# Patient Record
Sex: FEMALE | Race: WHITE | NOT HISPANIC OR LATINO | Employment: FULL TIME | URBAN - METROPOLITAN AREA
[De-identification: names, ages, dates, MRNs, and addresses within clinical notes are randomized per-mention and may not be internally consistent; named-entity substitution may affect disease eponyms.]

---

## 2017-06-27 ENCOUNTER — ALLSCRIPTS OFFICE VISIT (OUTPATIENT)
Dept: OTHER | Facility: OTHER | Age: 32
End: 2017-06-27

## 2017-06-27 DIAGNOSIS — E03.9 HYPOTHYROIDISM: ICD-10-CM

## 2017-06-27 DIAGNOSIS — G43.909 MIGRAINE WITHOUT STATUS MIGRAINOSUS, NOT INTRACTABLE: ICD-10-CM

## 2017-06-27 DIAGNOSIS — E78.5 HYPERLIPIDEMIA: ICD-10-CM

## 2017-06-27 DIAGNOSIS — R10.9 ABDOMINAL PAIN: ICD-10-CM

## 2017-06-30 ENCOUNTER — TRANSCRIBE ORDERS (OUTPATIENT)
Dept: ADMINISTRATIVE | Age: 32
End: 2017-06-30

## 2017-06-30 ENCOUNTER — APPOINTMENT (OUTPATIENT)
Dept: LAB | Age: 32
End: 2017-06-30
Payer: COMMERCIAL

## 2017-06-30 ENCOUNTER — HOSPITAL ENCOUNTER (OUTPATIENT)
Dept: RADIOLOGY | Age: 32
Discharge: HOME | End: 2017-06-30
Payer: COMMERCIAL

## 2017-06-30 DIAGNOSIS — R10.9 ABDOMINAL PAIN: ICD-10-CM

## 2017-06-30 DIAGNOSIS — E78.5 HYPERLIPIDEMIA: ICD-10-CM

## 2017-06-30 DIAGNOSIS — G43.909 MIGRAINE WITHOUT STATUS MIGRAINOSUS, NOT INTRACTABLE: ICD-10-CM

## 2017-06-30 DIAGNOSIS — E03.9 HYPOTHYROIDISM: ICD-10-CM

## 2017-06-30 LAB
ALBUMIN SERPL BCP-MCNC: 3.5 G/DL (ref 3.5–5)
ALP SERPL-CCNC: 47 U/L (ref 46–116)
ALT SERPL W P-5'-P-CCNC: 16 U/L (ref 12–78)
ANION GAP SERPL CALCULATED.3IONS-SCNC: 4 MMOL/L (ref 4–13)
AST SERPL W P-5'-P-CCNC: 14 U/L (ref 5–45)
BILIRUB SERPL-MCNC: 0.28 MG/DL (ref 0.2–1)
BUN SERPL-MCNC: 12 MG/DL (ref 5–25)
CALCIUM SERPL-MCNC: 8.7 MG/DL (ref 8.3–10.1)
CHLORIDE SERPL-SCNC: 105 MMOL/L (ref 100–108)
CHOLEST SERPL-MCNC: 236 MG/DL (ref 50–200)
CO2 SERPL-SCNC: 27 MMOL/L (ref 21–32)
CREAT SERPL-MCNC: 0.78 MG/DL (ref 0.6–1.3)
ERYTHROCYTE [DISTWIDTH] IN BLOOD BY AUTOMATED COUNT: 12.5 % (ref 11.6–15.1)
GFR SERPL CREATININE-BSD FRML MDRD: >60 ML/MIN/1.73SQ M
GLUCOSE P FAST SERPL-MCNC: 82 MG/DL (ref 65–99)
HCT VFR BLD AUTO: 39.5 % (ref 34.8–46.1)
HDLC SERPL-MCNC: 71 MG/DL (ref 40–60)
HGB BLD-MCNC: 12.8 G/DL (ref 11.5–15.4)
LDLC SERPL CALC-MCNC: 131 MG/DL (ref 0–100)
MCH RBC QN AUTO: 28.8 PG (ref 26.8–34.3)
MCHC RBC AUTO-ENTMCNC: 32.4 G/DL (ref 31.4–37.4)
MCV RBC AUTO: 89 FL (ref 82–98)
PLATELET # BLD AUTO: 236 THOUSANDS/UL (ref 149–390)
PMV BLD AUTO: 10.9 FL (ref 8.9–12.7)
POTASSIUM SERPL-SCNC: 4.1 MMOL/L (ref 3.5–5.3)
PROT SERPL-MCNC: 7.2 G/DL (ref 6.4–8.2)
RBC # BLD AUTO: 4.44 MILLION/UL (ref 3.81–5.12)
SODIUM SERPL-SCNC: 136 MMOL/L (ref 136–145)
TRIGL SERPL-MCNC: 168 MG/DL
TSH SERPL DL<=0.05 MIU/L-ACNC: 3.78 UIU/ML (ref 0.36–3.74)
WBC # BLD AUTO: 5.7 THOUSAND/UL (ref 4.31–10.16)

## 2017-06-30 PROCEDURE — 80053 COMPREHEN METABOLIC PANEL: CPT

## 2017-06-30 PROCEDURE — 85027 COMPLETE CBC AUTOMATED: CPT

## 2017-06-30 PROCEDURE — 84443 ASSAY THYROID STIM HORMONE: CPT

## 2017-06-30 PROCEDURE — 80061 LIPID PANEL: CPT

## 2017-06-30 PROCEDURE — 76705 ECHO EXAM OF ABDOMEN: CPT

## 2017-06-30 PROCEDURE — 36415 COLL VENOUS BLD VENIPUNCTURE: CPT

## 2017-07-06 ENCOUNTER — ALLSCRIPTS OFFICE VISIT (OUTPATIENT)
Dept: OTHER | Facility: OTHER | Age: 32
End: 2017-07-06

## 2017-10-03 ENCOUNTER — GENERIC CONVERSION - ENCOUNTER (OUTPATIENT)
Dept: OTHER | Facility: OTHER | Age: 32
End: 2017-10-03

## 2017-11-27 ENCOUNTER — ALLSCRIPTS OFFICE VISIT (OUTPATIENT)
Dept: OTHER | Facility: OTHER | Age: 32
End: 2017-11-27

## 2017-11-27 LAB
FLUAV AG SPEC QL IA: POSITIVE
INFLUENZA B AG (HISTORICAL): NEGATIVE

## 2017-11-29 ENCOUNTER — GENERIC CONVERSION - ENCOUNTER (OUTPATIENT)
Dept: OTHER | Facility: OTHER | Age: 32
End: 2017-11-29

## 2018-01-12 VITALS
WEIGHT: 157 LBS | HEIGHT: 66 IN | SYSTOLIC BLOOD PRESSURE: 110 MMHG | TEMPERATURE: 98.8 F | DIASTOLIC BLOOD PRESSURE: 72 MMHG | HEART RATE: 75 BPM | BODY MASS INDEX: 25.23 KG/M2 | OXYGEN SATURATION: 98 %

## 2018-01-13 VITALS
TEMPERATURE: 98.2 F | OXYGEN SATURATION: 96 % | HEIGHT: 66 IN | HEART RATE: 98 BPM | DIASTOLIC BLOOD PRESSURE: 72 MMHG | SYSTOLIC BLOOD PRESSURE: 116 MMHG

## 2018-01-13 VITALS
HEIGHT: 66 IN | OXYGEN SATURATION: 99 % | WEIGHT: 156.13 LBS | BODY MASS INDEX: 25.09 KG/M2 | DIASTOLIC BLOOD PRESSURE: 72 MMHG | SYSTOLIC BLOOD PRESSURE: 104 MMHG | HEART RATE: 76 BPM | TEMPERATURE: 98.4 F

## 2018-01-22 VITALS
HEIGHT: 66 IN | BODY MASS INDEX: 25.88 KG/M2 | SYSTOLIC BLOOD PRESSURE: 124 MMHG | DIASTOLIC BLOOD PRESSURE: 80 MMHG | WEIGHT: 161 LBS

## 2018-01-22 VITALS
HEART RATE: 65 BPM | HEIGHT: 66 IN | OXYGEN SATURATION: 97 % | DIASTOLIC BLOOD PRESSURE: 74 MMHG | WEIGHT: 158.5 LBS | SYSTOLIC BLOOD PRESSURE: 110 MMHG | BODY MASS INDEX: 25.47 KG/M2 | TEMPERATURE: 97.8 F

## 2018-10-04 ENCOUNTER — OFFICE VISIT (OUTPATIENT)
Dept: INTERNAL MEDICINE CLINIC | Facility: CLINIC | Age: 33
End: 2018-10-04
Payer: COMMERCIAL

## 2018-10-04 VITALS
BODY MASS INDEX: 26.84 KG/M2 | TEMPERATURE: 98.6 F | HEIGHT: 66 IN | HEART RATE: 69 BPM | DIASTOLIC BLOOD PRESSURE: 82 MMHG | OXYGEN SATURATION: 96 % | WEIGHT: 167 LBS | SYSTOLIC BLOOD PRESSURE: 122 MMHG

## 2018-10-04 DIAGNOSIS — E78.00 HYPERCHOLESTEREMIA: ICD-10-CM

## 2018-10-04 DIAGNOSIS — Z00.00 HEALTH CARE MAINTENANCE: Primary | ICD-10-CM

## 2018-10-04 DIAGNOSIS — Z23 NEEDS FLU SHOT: ICD-10-CM

## 2018-10-04 PROCEDURE — 90686 IIV4 VACC NO PRSV 0.5 ML IM: CPT | Performed by: NURSE PRACTITIONER

## 2018-10-04 PROCEDURE — 90471 IMMUNIZATION ADMIN: CPT

## 2018-10-04 PROCEDURE — 99395 PREV VISIT EST AGE 18-39: CPT | Performed by: NURSE PRACTITIONER

## 2018-10-04 RX ORDER — LORATADINE 10 MG/1
1 TABLET ORAL AS NEEDED
COMMUNITY
Start: 2017-11-27 | End: 2019-11-01 | Stop reason: ALTCHOICE

## 2018-10-04 RX ORDER — DROSPIRENONE AND ETHINYL ESTRADIOL 0.03MG-3MG
1 KIT ORAL DAILY
COMMUNITY
Start: 2017-07-27 | End: 2018-10-09 | Stop reason: SDUPTHER

## 2018-10-04 NOTE — PATIENT INSTRUCTIONS
PAP:  UTD - followed by GYN    Continue with healthy lifestyle  Recommend exercise on a routine basis  Discussed with patient some dietary modifications for her cholesterol  Eat more chicken/ fish/turkey, lean protein  Limit red meat  Yoga would be good for her back pain  Work on be stressing techniques  Will update blood work  I will call you with the result  Return yearly for physical or sooner if needed

## 2018-10-04 NOTE — PROGRESS NOTES
Assessment/Plan:    PAP:  UTD - followed by GYN  Influenza given today    Continue with healthy lifestyle  Recommend exercise on a routine basis  Discussed with patient some dietary modifications for her cholesterol  Eat more chicken/ fish/turkey, lean protein  Limit red meat  Yoga would be good for her back pain  Work on be stressing techniques  Will update blood work  I will call you with the result  Return yearly for physical or sooner if needed  Diagnoses and all orders for this visit:    Health care maintenance    Hypercholesteremia  -     CBC and differential; Future  -     Comprehensive metabolic panel; Future  -     Lipid panel; Future  -     TSH, 3rd generation with Free T4 reflex; Future    Needs flu shot  -     SYRINGE/SINGLE-DOSE VIAL: influenza vaccine, [id number], quadrivalent, 0 5 mL, preservative-free, for patients 3+ yr (FLUZONE)    Other orders  -     drospirenone-ethinyl estradiol (DAVID) 3-0 03 MG per tablet; Take 1 tablet by mouth daily  -     loratadine (CLARITIN) 10 mg tablet; Take 1 tablet by mouth as needed        Subjective:      Patient ID: Andrea Ham is a 28 y o  female and presents today for Health Maintenance Physical     Last Physical: 1 year ago  Last GYN Exam:  Last year - followed by GYN    Pt reports overall health:  yes    Healthy Diet:  Overall healthy  Adequate fruits and vegetables  Primarily chicken    Primarily water and ice tea  Routine Exercise:  no  Weight Concerns:  no    Problems with vision:  no  Last Eye Exam:  approx 3 year    Problems with Hearing:  no    Routine Dental Exams:  yes    Smoking History:  no  ETOH Use:  Few times a week a glass of wine  Illegal Drug Use:  no  Caffeine Use:  approx 30 oz coffee daily    Last PAP:  approx 3 years ago - followed by PAP  History of abnormal PAP:  no    Last Mammo: never  Family History of Breast CA:  no  Family History of Colon CA:  no    The following portions of the patient's history were reviewed and updated as appropriate: allergies, current medications, past family history, past medical history, past social history, past surgical history and problem list     Review of Systems   Constitutional: Negative for chills, fatigue and fever  HENT: Positive for sore throat ("tickle" last nights, slight, fells like allergies)  Negative for congestion, ear pain, postnasal drip, sinus pain and sinus pressure  Eyes: Negative for visual disturbance  Respiratory: Negative for cough, shortness of breath and wheezing  Cardiovascular: Negative for chest pain and palpitations  Gastrointestinal: Negative for abdominal pain, constipation, diarrhea, nausea and vomiting  Genitourinary: Negative for difficulty urinating, dysuria, hematuria, vaginal bleeding and vaginal discharge  Musculoskeletal: Positive for back pain  Negative for arthralgias and myalgias  Skin: Negative for rash  Neurological: Negative for dizziness, light-headedness, numbness and headaches  Psychiatric/Behavioral: Negative for dysphoric mood and sleep disturbance  The patient is not nervous/anxious  Patient does report she does has episodes of "short fuse" and irritability  This is primarily relating to her home life  Patient reports she does not experience this at work  Patient denies problems with in the home life  Just her children can be demanding at times         History reviewed  No pertinent past medical history        Current Outpatient Prescriptions:     drospirenone-ethinyl estradiol (DAVID) 3-0 03 MG per tablet, Take 1 tablet by mouth daily, Disp: , Rfl:     loratadine (CLARITIN) 10 mg tablet, Take 1 tablet by mouth as needed, Disp: , Rfl:     Allergies   Allergen Reactions    Pollen Extract Allergic Rhinitis       Social History   Past Surgical History:   Procedure Laterality Date    TOOTH EXTRACTION       Family History   Problem Relation Age of Onset    Hypothyroidism Mother     Coronary artery disease Father     Heart attack Father     Hypertension Father     Lung cancer Paternal Grandmother     Diabetes Paternal Grandfather     Hearing loss Daughter         deaf in right ear, microtia of right ear    Depression Family     Post-traumatic stress disorder Family     Anxiety disorder Family         symptom    Migraines Family     Thyroid disease Family         thyroid disorder        Objective:  /82 (BP Location: Left arm, Patient Position: Sitting, Cuff Size: Large)   Pulse 69   Temp 98 6 °F (37 °C) (Oral)   Ht 5' 6" (1 676 m)   Wt 75 8 kg (167 lb)   SpO2 96% Comment: room air  BMI 26 95 kg/m²      Physical Exam   Constitutional: She is oriented to person, place, and time  She appears well-developed and well-nourished  No distress  HENT:   Head: Normocephalic and atraumatic  Right Ear: Hearing, tympanic membrane, external ear and ear canal normal    Left Ear: Hearing, tympanic membrane, external ear and ear canal normal    Nose: No mucosal edema or rhinorrhea  Right sinus exhibits no maxillary sinus tenderness and no frontal sinus tenderness  Left sinus exhibits no maxillary sinus tenderness and no frontal sinus tenderness  Mouth/Throat: Uvula is midline, oropharynx is clear and moist and mucous membranes are normal    No post nasal gtt   Neck: Neck supple  No thyromegaly present  Cardiovascular: Normal rate, regular rhythm and normal heart sounds  No murmur heard  No pedal edema   Pulmonary/Chest: Effort normal and breath sounds normal  No respiratory distress  She has no wheezes  Abdominal: Soft  Bowel sounds are normal  She exhibits no distension  There is no tenderness  Musculoskeletal: Normal range of motion  Lymphadenopathy:     She has no cervical adenopathy  Neurological: She is alert and oriented to person, place, and time  No cranial nerve deficit  Skin: Skin is warm and dry  No rash noted  Psychiatric: She has a normal mood and affect   Her behavior is normal  Judgment and thought content normal    Nursing note and vitals reviewed

## 2018-10-09 ENCOUNTER — ANNUAL EXAM (OUTPATIENT)
Dept: OBGYN CLINIC | Facility: CLINIC | Age: 33
End: 2018-10-09
Payer: COMMERCIAL

## 2018-10-09 VITALS
BODY MASS INDEX: 27 KG/M2 | SYSTOLIC BLOOD PRESSURE: 108 MMHG | WEIGHT: 168 LBS | HEIGHT: 66 IN | DIASTOLIC BLOOD PRESSURE: 78 MMHG

## 2018-10-09 DIAGNOSIS — Z30.011 ORAL CONTRACEPTIVE PRESCRIBED: ICD-10-CM

## 2018-10-09 DIAGNOSIS — Z01.419 ENCOUNTER FOR GYNECOLOGICAL EXAMINATION WITHOUT ABNORMAL FINDING: Primary | ICD-10-CM

## 2018-10-09 PROBLEM — Z00.00 HEALTH CARE MAINTENANCE: Status: RESOLVED | Noted: 2018-10-04 | Resolved: 2018-10-09

## 2018-10-09 PROCEDURE — S0612 ANNUAL GYNECOLOGICAL EXAMINA: HCPCS | Performed by: OBSTETRICS & GYNECOLOGY

## 2018-10-09 RX ORDER — DROSPIRENONE AND ETHINYL ESTRADIOL 0.03MG-3MG
1 KIT ORAL DAILY
Qty: 90 TABLET | Refills: 3 | Status: SHIPPED | OUTPATIENT
Start: 2018-10-09 | End: 2020-10-14 | Stop reason: ALTCHOICE

## 2018-10-09 NOTE — PROGRESS NOTES
Karoline Penn  1985      CC:  Yearly exam    S:  28 y o  female here for yearly exam  Her cycles are regular, not heavy or crampy  She is sexually active  She uses her 's vasectomy and BCP (as her  has not yet had his confirmatory SA) for contraception  She will stop the BCP when he has his semen analysis  Last Pap 9/1/2016 normal/negative HPV      Current Outpatient Prescriptions:     drospirenone-ethinyl estradiol (DAVID) 3-0 03 MG per tablet, Take 1 tablet by mouth daily, Disp: , Rfl:     loratadine (CLARITIN) 10 mg tablet, Take 1 tablet by mouth as needed, Disp: , Rfl:   Social History     Social History    Marital status: /Civil Union     Spouse name: N/A    Number of children: N/A    Years of education: N/A     Occupational History    Not on file  Social History Main Topics    Smoking status: Never Smoker    Smokeless tobacco: Never Used    Alcohol use 4 2 oz/week     7 Glasses of wine per week    Drug use: No    Sexual activity: Yes     Birth control/ protection: Pill     Other Topics Concern    Not on file     Social History Narrative    Always uses seat belt     Daily coffee consumption (2cups/day)    Lack of exercise      Family History   Problem Relation Age of Onset    Hypothyroidism Mother     Coronary artery disease Father     Heart attack Father     Hypertension Father     Lung cancer Paternal Grandmother     Diabetes Paternal Grandfather     Hearing loss Daughter         deaf in right ear, microtia of right ear    Depression Family     Post-traumatic stress disorder Family     Anxiety disorder Family         symptom    Migraines Family     Thyroid disease Family         thyroid disorder     No Known Problems Sister     No Known Problems Sister       History reviewed  No pertinent past medical history  O:  Blood pressure 108/78, height 5' 5 5" (1 664 m), weight 76 2 kg (168 lb), last menstrual period 09/18/2018      Patient appears well and is not in distress  Neck is supple without masses  Breasts are symmetrical without mass, tenderness, nipple discharge, skin changes or adenopathy  Abdomen is soft and nontender without masses  External genitals are normal without lesions or rashes  Vagina is normal without discharge or bleeding  Cervix is normal without discharge or lesion  Uterus is normal, mobile, nontender without palpable mass  Adnexa are normal, nontender, without palpable mass  A:  Yearly exam      P:   Pap due 2021   Rx sent for BCP   RTO one year for yearly exam or sooner as needed

## 2018-12-21 ENCOUNTER — CLINICAL SUPPORT (OUTPATIENT)
Dept: INTERNAL MEDICINE CLINIC | Facility: CLINIC | Age: 33
End: 2018-12-21
Payer: COMMERCIAL

## 2018-12-21 DIAGNOSIS — E78.00 HYPERCHOLESTEREMIA: Primary | ICD-10-CM

## 2018-12-21 LAB
ALBUMIN SERPL BCP-MCNC: 3.8 G/DL (ref 3.5–5)
ALP SERPL-CCNC: 56 U/L (ref 46–116)
ALT SERPL W P-5'-P-CCNC: 19 U/L (ref 12–78)
ANION GAP SERPL CALCULATED.3IONS-SCNC: 8 MMOL/L (ref 4–13)
AST SERPL W P-5'-P-CCNC: 13 U/L (ref 5–45)
BASOPHILS # BLD AUTO: 0.03 THOUSANDS/ΜL (ref 0–0.1)
BASOPHILS NFR BLD AUTO: 0 % (ref 0–1)
BILIRUB SERPL-MCNC: 0.37 MG/DL (ref 0.2–1)
BUN SERPL-MCNC: 15 MG/DL (ref 5–25)
CALCIUM SERPL-MCNC: 9.1 MG/DL (ref 8.3–10.1)
CHLORIDE SERPL-SCNC: 104 MMOL/L (ref 100–108)
CHOLEST SERPL-MCNC: 267 MG/DL (ref 50–200)
CO2 SERPL-SCNC: 25 MMOL/L (ref 21–32)
CREAT SERPL-MCNC: 0.86 MG/DL (ref 0.6–1.3)
EOSINOPHIL # BLD AUTO: 0.11 THOUSAND/ΜL (ref 0–0.61)
EOSINOPHIL NFR BLD AUTO: 2 % (ref 0–6)
ERYTHROCYTE [DISTWIDTH] IN BLOOD BY AUTOMATED COUNT: 12.5 % (ref 11.6–15.1)
GFR SERPL CREATININE-BSD FRML MDRD: 88 ML/MIN/1.73SQ M
GLUCOSE P FAST SERPL-MCNC: 87 MG/DL (ref 65–99)
HCT VFR BLD AUTO: 42.2 % (ref 34.8–46.1)
HDLC SERPL-MCNC: 72 MG/DL (ref 40–60)
HGB BLD-MCNC: 13.2 G/DL (ref 11.5–15.4)
IMM GRANULOCYTES # BLD AUTO: 0.02 THOUSAND/UL (ref 0–0.2)
IMM GRANULOCYTES NFR BLD AUTO: 0 % (ref 0–2)
LDLC SERPL CALC-MCNC: 151 MG/DL (ref 0–100)
LYMPHOCYTES # BLD AUTO: 2.5 THOUSANDS/ΜL (ref 0.6–4.47)
LYMPHOCYTES NFR BLD AUTO: 37 % (ref 14–44)
MCH RBC QN AUTO: 29.1 PG (ref 26.8–34.3)
MCHC RBC AUTO-ENTMCNC: 31.3 G/DL (ref 31.4–37.4)
MCV RBC AUTO: 93 FL (ref 82–98)
MONOCYTES # BLD AUTO: 0.56 THOUSAND/ΜL (ref 0.17–1.22)
MONOCYTES NFR BLD AUTO: 8 % (ref 4–12)
NEUTROPHILS # BLD AUTO: 3.61 THOUSANDS/ΜL (ref 1.85–7.62)
NEUTS SEG NFR BLD AUTO: 53 % (ref 43–75)
NONHDLC SERPL-MCNC: 195 MG/DL
NRBC BLD AUTO-RTO: 0 /100 WBCS
PLATELET # BLD AUTO: 251 THOUSANDS/UL (ref 149–390)
PMV BLD AUTO: 10.8 FL (ref 8.9–12.7)
POTASSIUM SERPL-SCNC: 4.4 MMOL/L (ref 3.5–5.3)
PROT SERPL-MCNC: 7.5 G/DL (ref 6.4–8.2)
RBC # BLD AUTO: 4.53 MILLION/UL (ref 3.81–5.12)
SODIUM SERPL-SCNC: 137 MMOL/L (ref 136–145)
T4 FREE SERPL-MCNC: 0.79 NG/DL (ref 0.76–1.46)
TRIGL SERPL-MCNC: 222 MG/DL
TSH SERPL DL<=0.05 MIU/L-ACNC: 4.17 UIU/ML (ref 0.36–3.74)
WBC # BLD AUTO: 6.83 THOUSAND/UL (ref 4.31–10.16)

## 2018-12-21 PROCEDURE — 80061 LIPID PANEL: CPT | Performed by: NURSE PRACTITIONER

## 2018-12-21 PROCEDURE — 36415 COLL VENOUS BLD VENIPUNCTURE: CPT

## 2018-12-21 PROCEDURE — 85025 COMPLETE CBC W/AUTO DIFF WBC: CPT | Performed by: NURSE PRACTITIONER

## 2018-12-21 PROCEDURE — 84439 ASSAY OF FREE THYROXINE: CPT

## 2018-12-21 PROCEDURE — 84443 ASSAY THYROID STIM HORMONE: CPT | Performed by: NURSE PRACTITIONER

## 2018-12-21 PROCEDURE — 80053 COMPREHEN METABOLIC PANEL: CPT | Performed by: NURSE PRACTITIONER

## 2018-12-26 ENCOUNTER — TELEPHONE (OUTPATIENT)
Dept: INTERNAL MEDICINE CLINIC | Age: 33
End: 2018-12-26

## 2018-12-26 NOTE — TELEPHONE ENCOUNTER
Please have pt schedule follow-up to review labs  Non-urgent  But few things to discuss further - has only been seen for physical recently    THANKS!!

## 2018-12-28 ENCOUNTER — OFFICE VISIT (OUTPATIENT)
Dept: INTERNAL MEDICINE CLINIC | Age: 33
End: 2018-12-28
Payer: COMMERCIAL

## 2018-12-28 VITALS
BODY MASS INDEX: 27.53 KG/M2 | HEART RATE: 75 BPM | WEIGHT: 175.4 LBS | HEIGHT: 67 IN | DIASTOLIC BLOOD PRESSURE: 72 MMHG | OXYGEN SATURATION: 98 % | SYSTOLIC BLOOD PRESSURE: 110 MMHG | TEMPERATURE: 97.9 F

## 2018-12-28 DIAGNOSIS — E78.00 HYPERCHOLESTEREMIA: Primary | ICD-10-CM

## 2018-12-28 DIAGNOSIS — E03.8 SUBCLINICAL HYPOTHYROIDISM: ICD-10-CM

## 2018-12-28 PROCEDURE — 99213 OFFICE O/P EST LOW 20 MIN: CPT | Performed by: NURSE PRACTITIONER

## 2018-12-28 RX ORDER — CHLORAL HYDRATE 500 MG
1000 CAPSULE ORAL DAILY
Refills: 0
Start: 2018-12-28 | End: 2020-11-13

## 2018-12-28 NOTE — PROGRESS NOTES
Assessment/Plan:    HLD  No other cardiac risk factors  Start fish oil 1000mg daily if tolerates increase to BID  Pt is going to work on diet and exercise with the new year  Repeat lipids in 1 year    Subclinical Hypothyroid  Asymptomatic  Monitor  Repeat TSH 1 year    Pt to follow-up in 1 year, sooner if needed  Labs given for next year     Diagnoses and all orders for this visit:    Hypercholesteremia  -     Lipid panel; Future  -     CBC and differential; Future  -     Comprehensive metabolic panel; Future  -     TSH, 3rd generation with Free T4 reflex; Future  -     Omega-3 Fatty Acids (FISH OIL) 1,000 mg; Take 1 capsule (1,000 mg total) by mouth daily    Subclinical hypothyroidism          Subjective:      Patient ID: Krishna Zuñiga is a 28 y o  female  Pt presents for follow-up to review labs  Pt is doing well with no concerns  Hyperlipidemia:  Pt is here for follow-up hyperlipidemia  Pt is doing fair with no concerns  Pt is currently taking no medications  Previously was on fish oil approx 10 years ago  She does have a family history of HLD - her father  Pt diet consists of room for improvement - snacks and sweets  50/50 red meat and chicken/seafood  The pt does exercise on a routine basis  Last lipid panel was 12/2018    Subclinical Hypothyroid  Thyroid Problem   Presents for follow-up visit  Symptoms include anxiety and weight gain (gradual - however diet isn't as good as previously and has not been exercising)  Patient reports no cold intolerance, constipation, depressed mood, diarrhea, dry skin, fatigue, hair loss, heat intolerance, menstrual problem or palpitations     Pt not currently on medications    The following portions of the patient's history were reviewed and updated as appropriate: allergies, current medications, past family history, past medical history, past social history, past surgical history and problem list     Review of Systems   Constitutional: Positive for weight gain (gradual - however diet isn't as good as previously and has not been exercising)  Negative for chills, fatigue and fever  Eyes: Negative for visual disturbance  Respiratory: Negative for cough, shortness of breath and wheezing  Cardiovascular: Negative for chest pain and palpitations  Gastrointestinal: Negative for abdominal pain, constipation, diarrhea, nausea and vomiting  Endocrine: Negative for cold intolerance and heat intolerance  Genitourinary: Negative for menstrual problem and vaginal discharge  Skin: Negative for rash  Neurological: Negative for dizziness, syncope, light-headedness, numbness and headaches  Psychiatric/Behavioral: Negative for dysphoric mood and sleep disturbance  The patient is nervous/anxious  History reviewed  No pertinent past medical history        Current Outpatient Prescriptions:     drospirenone-ethinyl estradiol (DAVID) 3-0 03 MG per tablet, Take 1 tablet by mouth daily, Disp: 90 tablet, Rfl: 3    loratadine (CLARITIN) 10 mg tablet, Take 1 tablet by mouth as needed, Disp: , Rfl:     Omega-3 Fatty Acids (FISH OIL) 1,000 mg, Take 1 capsule (1,000 mg total) by mouth daily, Disp: , Rfl: 0    Allergies   Allergen Reactions    Pollen Extract Allergic Rhinitis       Social History   Past Surgical History:   Procedure Laterality Date    TOOTH EXTRACTION       Family History   Problem Relation Age of Onset    Hypothyroidism Mother     Coronary artery disease Father     Heart attack Father     Hypertension Father     Lung cancer Paternal Grandmother     Diabetes Paternal Grandfather     Hearing loss Daughter         deaf in right ear, microtia of right ear    Depression Family     Post-traumatic stress disorder Family     Anxiety disorder Family         symptom    Migraines Family     Thyroid disease Family         thyroid disorder     No Known Problems Sister     No Known Problems Sister        Objective:  /72 (BP Location: Left arm, Patient Position: Sitting, Cuff Size: Adult)   Pulse 75   Temp 97 9 °F (36 6 °C) (Tympanic)   Ht 5' 6 73" (1 695 m)   Wt 79 6 kg (175 lb 6 4 oz)   SpO2 98%   BMI 27 69 kg/m²      Physical Exam   Constitutional: She is oriented to person, place, and time  She appears well-developed and well-nourished  No distress  Neck: Neck supple  No thyromegaly present  Cardiovascular: Normal rate, regular rhythm and normal heart sounds  No murmur heard  No pedal edema   Pulmonary/Chest: Effort normal and breath sounds normal  No respiratory distress  She has no wheezes  Abdominal: Soft  Bowel sounds are normal  She exhibits no distension  There is no tenderness  Musculoskeletal: Normal range of motion  Neurological: She is alert and oriented to person, place, and time  No focal deficits   Skin: Skin is warm and dry  No rash noted  Psychiatric: She has a normal mood and affect  Her behavior is normal  Judgment and thought content normal    Nursing note and vitals reviewed

## 2018-12-28 NOTE — PATIENT INSTRUCTIONS
Hyperlipidemia:  Would restart fish oil  Start with daily and increase to twice a day if tolerate  Recommend healthy lifestyle choices for your cholesterol  Low fat/low cholesterol diet  Limit/avoid red meat  Eat more lean meat - chicken breast, ground turkey, fish  Exercise 30 mins at least 3 times a week as tolerated  Subclinical hypothyroid:  Asymptomatic  Continue to monitor

## 2019-03-26 ENCOUNTER — OFFICE VISIT (OUTPATIENT)
Dept: INTERNAL MEDICINE CLINIC | Facility: CLINIC | Age: 34
End: 2019-03-26
Payer: COMMERCIAL

## 2019-03-26 VITALS
TEMPERATURE: 98.2 F | HEART RATE: 69 BPM | HEIGHT: 66 IN | WEIGHT: 161.8 LBS | OXYGEN SATURATION: 98 % | BODY MASS INDEX: 26 KG/M2 | DIASTOLIC BLOOD PRESSURE: 70 MMHG | SYSTOLIC BLOOD PRESSURE: 120 MMHG

## 2019-03-26 DIAGNOSIS — Z00.00 ANNUAL PHYSICAL EXAM: ICD-10-CM

## 2019-03-26 DIAGNOSIS — Z02.1 PHYSICAL EXAM, PRE-EMPLOYMENT: Primary | ICD-10-CM

## 2019-03-26 DIAGNOSIS — Z11.1 SCREENING-PULMONARY TB: ICD-10-CM

## 2019-03-26 PROCEDURE — 99395 PREV VISIT EST AGE 18-39: CPT | Performed by: INTERNAL MEDICINE

## 2019-03-26 PROCEDURE — 86580 TB INTRADERMAL TEST: CPT

## 2019-03-26 NOTE — PROGRESS NOTES
Assessment/Plan:    Physical exam, pre-employment  Pre-employment form completed today  No concerns or reservations for performing tasks of employment  TB skin test administered today, patient to return in 48-72 hours to have read  Annual physical exam  Up-to-date on immunizations  She follows a well-balanced diet and exercises regularly  Up-to-date on metabolic screening  Diagnoses and all orders for this visit:    Physical exam, pre-employment    Annual physical exam          Subjective:      Patient ID: Sonya Loya is a 28 y o  female  16-year-old female is seen today for pre-employment physical examination  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  She is up to date on immunizations  She is required to have Tb testing  She has no active complaints at this time  Metabolic screening is up to date  The following portions of the patient's history were reviewed and updated as appropriate: allergies, current medications, past family history, past medical history, past social history, past surgical history and problem list     Review of Systems   Constitutional: Negative for activity change, appetite change, chills, diaphoresis, fatigue and fever  HENT: Negative for congestion, postnasal drip, rhinorrhea, sinus pressure, sinus pain, sneezing and sore throat  Eyes: Negative for visual disturbance  Respiratory: Negative for apnea, cough, choking, chest tightness, shortness of breath and wheezing  Cardiovascular: Negative for chest pain, palpitations and leg swelling  Gastrointestinal: Negative for abdominal distention, abdominal pain, anal bleeding, blood in stool, constipation, diarrhea, nausea and vomiting  Endocrine: Negative for cold intolerance and heat intolerance  Genitourinary: Negative for difficulty urinating, dysuria and hematuria  Musculoskeletal: Negative  Skin: Negative      Neurological: Negative for dizziness, weakness, light-headedness, numbness and headaches  Hematological: Negative for adenopathy  Psychiatric/Behavioral: Negative for agitation, sleep disturbance and suicidal ideas  All other systems reviewed and are negative  History reviewed  No pertinent past medical history  Current Outpatient Medications:     drospirenone-ethinyl estradiol (DAVID) 3-0 03 MG per tablet, Take 1 tablet by mouth daily, Disp: 90 tablet, Rfl: 3    loratadine (CLARITIN) 10 mg tablet, Take 1 tablet by mouth as needed, Disp: , Rfl:     Omega-3 Fatty Acids (FISH OIL) 1,000 mg, Take 1 capsule (1,000 mg total) by mouth daily, Disp: , Rfl: 0    Allergies   Allergen Reactions    Pollen Extract Allergic Rhinitis       Social History   Past Surgical History:   Procedure Laterality Date    TOOTH EXTRACTION       Family History   Problem Relation Age of Onset    Hypothyroidism Mother     Coronary artery disease Father     Heart attack Father     Hypertension Father     Lung cancer Paternal Grandmother     Diabetes Paternal Grandfather     Hearing loss Daughter         deaf in right ear, microtia of right ear    Depression Family     Post-traumatic stress disorder Family     Anxiety disorder Family         symptom    Migraines Family     Thyroid disease Family         thyroid disorder     No Known Problems Sister     No Known Problems Sister        Objective:  /70 (BP Location: Right arm, Patient Position: Sitting, Cuff Size: Adult)   Pulse 69   Temp 98 2 °F (36 8 °C) (Oral)   Ht 5' 6" (1 676 m)   Wt 73 4 kg (161 lb 12 8 oz)   SpO2 98%   BMI 26 12 kg/m²     No results found for this or any previous visit (from the past 1344 hour(s))  Physical Exam   Constitutional: She is oriented to person, place, and time  She appears well-developed and well-nourished  No distress  HENT:   Head: Normocephalic and atraumatic  Eyes: Pupils are equal, round, and reactive to light   Conjunctivae and EOM are normal  Right eye exhibits no discharge  Left eye exhibits no discharge  Neck: Normal range of motion  Neck supple  No JVD present  No thyromegaly present  Cardiovascular: Normal rate, regular rhythm, normal heart sounds and intact distal pulses  Exam reveals no gallop and no friction rub  No murmur heard  Pulmonary/Chest: Effort normal and breath sounds normal  No respiratory distress  She has no wheezes  She has no rales  She exhibits no tenderness  Abdominal: Soft  She exhibits no distension  There is no tenderness  Musculoskeletal: Normal range of motion  She exhibits no edema, tenderness or deformity  Lymphadenopathy:     She has no cervical adenopathy  Neurological: She is alert and oriented to person, place, and time  No cranial nerve deficit  Coordination normal    Skin: Skin is warm and dry  No rash noted  She is not diaphoretic  No erythema  No pallor  Psychiatric: She has a normal mood and affect  Her behavior is normal  Judgment and thought content normal    Nursing note and vitals reviewed

## 2019-03-26 NOTE — ASSESSMENT & PLAN NOTE
Pre-employment form completed today  No concerns or reservations for performing tasks of employment  TB skin test administered today, patient to return in 48-72 hours to have read

## 2019-03-26 NOTE — ASSESSMENT & PLAN NOTE
Up-to-date on immunizations  She follows a well-balanced diet and exercises regularly  Up-to-date on metabolic screening

## 2019-03-28 ENCOUNTER — CLINICAL SUPPORT (OUTPATIENT)
Dept: INTERNAL MEDICINE CLINIC | Facility: CLINIC | Age: 34
End: 2019-03-28

## 2019-03-28 DIAGNOSIS — Z11.1 ENCOUNTER FOR PPD SKIN TEST READING: Primary | ICD-10-CM

## 2019-03-28 LAB
INDURATION: 0 MM
TB SKIN TEST: NEGATIVE

## 2019-04-22 ENCOUNTER — OFFICE VISIT (OUTPATIENT)
Dept: INTERNAL MEDICINE CLINIC | Facility: CLINIC | Age: 34
End: 2019-04-22
Payer: COMMERCIAL

## 2019-04-22 VITALS
SYSTOLIC BLOOD PRESSURE: 110 MMHG | BODY MASS INDEX: 25.24 KG/M2 | TEMPERATURE: 98.2 F | HEART RATE: 84 BPM | WEIGHT: 160.8 LBS | DIASTOLIC BLOOD PRESSURE: 80 MMHG | OXYGEN SATURATION: 98 % | HEIGHT: 67 IN

## 2019-04-22 DIAGNOSIS — J02.9 VIRAL PHARYNGITIS: Primary | ICD-10-CM

## 2019-04-22 LAB — S PYO AG THROAT QL: NEGATIVE

## 2019-04-22 PROCEDURE — 87880 STREP A ASSAY W/OPTIC: CPT | Performed by: NURSE PRACTITIONER

## 2019-04-22 PROCEDURE — 1036F TOBACCO NON-USER: CPT | Performed by: NURSE PRACTITIONER

## 2019-04-22 PROCEDURE — 3008F BODY MASS INDEX DOCD: CPT | Performed by: NURSE PRACTITIONER

## 2019-04-22 PROCEDURE — 99213 OFFICE O/P EST LOW 20 MIN: CPT | Performed by: NURSE PRACTITIONER

## 2019-06-12 ENCOUNTER — TELEPHONE (OUTPATIENT)
Dept: OBGYN CLINIC | Facility: CLINIC | Age: 34
End: 2019-06-12

## 2019-11-01 ENCOUNTER — TELEPHONE (OUTPATIENT)
Dept: INTERNAL MEDICINE CLINIC | Facility: CLINIC | Age: 34
End: 2019-11-01

## 2019-11-01 ENCOUNTER — OFFICE VISIT (OUTPATIENT)
Dept: INTERNAL MEDICINE CLINIC | Facility: CLINIC | Age: 34
End: 2019-11-01
Payer: COMMERCIAL

## 2019-11-01 VITALS
HEART RATE: 89 BPM | WEIGHT: 169 LBS | TEMPERATURE: 97.8 F | SYSTOLIC BLOOD PRESSURE: 100 MMHG | HEIGHT: 66 IN | BODY MASS INDEX: 27.16 KG/M2 | OXYGEN SATURATION: 98 % | DIASTOLIC BLOOD PRESSURE: 70 MMHG

## 2019-11-01 DIAGNOSIS — G43.101 MIGRAINE WITH AURA AND WITH STATUS MIGRAINOSUS, NOT INTRACTABLE: Primary | ICD-10-CM

## 2019-11-01 PROCEDURE — 99213 OFFICE O/P EST LOW 20 MIN: CPT | Performed by: INTERNAL MEDICINE

## 2019-11-01 PROCEDURE — 3008F BODY MASS INDEX DOCD: CPT | Performed by: INTERNAL MEDICINE

## 2019-11-01 RX ORDER — BUTALBITAL, ACETAMINOPHEN AND CAFFEINE 50; 325; 40 MG/1; MG/1; MG/1
1 TABLET ORAL EVERY 4 HOURS PRN
Qty: 30 TABLET | Refills: 0 | Status: SHIPPED | OUTPATIENT
Start: 2019-11-01 | End: 2019-11-01

## 2019-11-01 RX ORDER — BUTALBITAL, ACETAMINOPHEN AND CAFFEINE 50; 325; 40 MG/1; MG/1; MG/1
1 TABLET ORAL EVERY 4 HOURS PRN
Qty: 30 TABLET | Refills: 0 | Status: SHIPPED | OUTPATIENT
Start: 2019-11-01 | End: 2019-11-01 | Stop reason: SDUPTHER

## 2019-11-01 RX ORDER — SUMATRIPTAN 100 MG/1
100 TABLET, FILM COATED ORAL ONCE AS NEEDED
Qty: 9 TABLET | Refills: 1 | Status: SHIPPED | OUTPATIENT
Start: 2019-11-01 | End: 2021-10-20

## 2019-11-01 RX ORDER — BUTALBITAL, ACETAMINOPHEN AND CAFFEINE 50; 325; 40 MG/1; MG/1; MG/1
1 TABLET ORAL EVERY 4 HOURS PRN
Qty: 30 TABLET | Refills: 0 | Status: SHIPPED | OUTPATIENT
Start: 2019-11-01 | End: 2021-10-20 | Stop reason: ALTCHOICE

## 2019-11-01 NOTE — PROGRESS NOTES
Assessment/Plan:    No problem-specific Assessment & Plan notes found for this encounter  Diagnoses and all orders for this visit:    Migraine with aura and with status migrainosus, not intractable  -     SUMAtriptan (IMITREX) 100 mg tablet; Take 1 tablet (100 mg total) by mouth once as needed for migraine (Max 2 tabs per episode) for up to 30 doses  -     Discontinue: butalbital-acetaminophen-caffeine (FIORICET,ESGIC) -40 mg per tablet; Take 1 tablet by mouth every 4 (four) hours as needed for headaches  -     butalbital-acetaminophen-caffeine (FIORICET,ESGIC) -40 mg per tablet; Take 1 tablet by mouth every 4 (four) hours as needed for headaches    Other orders  -     Cancel: influenza vaccine, [id number], quadrivalent, 0 5 mL, preservative-free, for adult and pediatric patients 6 mos+ (AFLURIA, FLUARIX, FLULAVAL, FLUZONE)          Subjective:      Patient ID: Saritha Marroquin is a 27 y o  female  Patient presents to the office with a one-week history agreeable low-grade migraine type headache  This has not been associated with any nausea or vomiting  Usually she has a mild aura preceding the headaches but on this particular occasion she did not experience any aura  Headache is described as left hemicranial with some component of frontal headache as well  She has significant photophobia  She denies any other significant symptoms  She has taken some over-the-counter migraine relief medications without improvement  She has never taken any type of Triptan medication  Medical history is otherwise significant form mild subclinical hypothyroidism and some mild dyslipidemia not requiring any statin medication or other intervention        Family History   Problem Relation Age of Onset    Hypothyroidism Mother     Coronary artery disease Father     Heart attack Father     Hypertension Father     Lung cancer Paternal Grandmother     Hearing loss Daughter         deaf in right ear, microtia of right ear    Depression Family     Post-traumatic stress disorder Family     Anxiety disorder Family         symptom    Migraines Family     Thyroid disease Family         thyroid disorder     No Known Problems Sister     No Known Problems Sister     Lung cancer Maternal Grandmother     Diabetes Maternal Grandfather      Social History     Socioeconomic History    Marital status: /Civil Union     Spouse name: Not on file    Number of children: Not on file    Years of education: Not on file    Highest education level: Not on file   Occupational History    Not on file   Social Needs    Financial resource strain: Not on file    Food insecurity:     Worry: Not on file     Inability: Not on file    Transportation needs:     Medical: Not on file     Non-medical: Not on file   Tobacco Use    Smoking status: Never Smoker    Smokeless tobacco: Never Used   Substance and Sexual Activity    Alcohol use:  Yes     Alcohol/week: 2 0 standard drinks     Types: 2 Glasses of wine per week     Frequency: 2-3 times a week     Drinks per session: 1 or 2     Binge frequency: Never     Comment: 6 glasses a week    Drug use: No    Sexual activity: Yes     Birth control/protection: Pill   Lifestyle    Physical activity:     Days per week: Not on file     Minutes per session: Not on file    Stress: Not on file   Relationships    Social connections:     Talks on phone: Not on file     Gets together: Not on file     Attends [employer] service: Not on file     Active member of club or organization: Not on file     Attends meetings of clubs or organizations: Not on file     Relationship status: Not on file    Intimate partner violence:     Fear of current or ex partner: Not on file     Emotionally abused: Not on file     Physically abused: Not on file     Forced sexual activity: Not on file   Other Topics Concern    Not on file   Social History Narrative    Always uses seat belt     Daily coffee consumption (2cups/day)    Lack of exercise      History reviewed  No pertinent past medical history  Current Outpatient Medications:     drospirenone-ethinyl estradiol (DAVID) 3-0 03 MG per tablet, Take 1 tablet by mouth daily, Disp: 90 tablet, Rfl: 3    Omega-3 Fatty Acids (FISH OIL) 1,000 mg, Take 1 capsule (1,000 mg total) by mouth daily, Disp: , Rfl: 0    butalbital-acetaminophen-caffeine (FIORICET,ESGIC) -40 mg per tablet, Take 1 tablet by mouth every 4 (four) hours as needed for headaches, Disp: 30 tablet, Rfl: 0    SUMAtriptan (IMITREX) 100 mg tablet, Take 1 tablet (100 mg total) by mouth once as needed for migraine (Max 2 tabs per episode) for up to 30 doses, Disp: 9 tablet, Rfl: 1  Allergies   Allergen Reactions    Pollen Extract Allergic Rhinitis     Past Surgical History:   Procedure Laterality Date    TOOTH EXTRACTION           Review of Systems   Constitutional: Negative  HENT: Negative  Eyes: Negative  Respiratory: Negative  Cardiovascular: Negative  Gastrointestinal: Negative  Genitourinary: Negative  Musculoskeletal: Negative  Neurological: Positive for headaches  Psychiatric/Behavioral: Negative  Objective:      /70 (BP Location: Left arm, Patient Position: Sitting, Cuff Size: Adult)   Pulse 89   Temp 97 8 °F (36 6 °C) (Oral)   Ht 5' 6" (1 676 m)   Wt 76 7 kg (169 lb) Comment: with shoes  SpO2 98% Comment: ra  BMI 27 28 kg/m²          Physical Exam   Constitutional: She is oriented to person, place, and time  She appears well-developed and well-nourished  No distress  HENT:   Head: Normocephalic and atraumatic  Right Ear: External ear normal    Left Ear: External ear normal    Eyes: Pupils are equal, round, and reactive to light  Conjunctivae are normal  No scleral icterus  Neck: Neck supple  No JVD present  No tracheal deviation present  Cardiovascular: Normal rate and regular rhythm     Pulmonary/Chest: Effort normal  No respiratory distress  Abdominal: She exhibits no distension  Musculoskeletal: She exhibits no edema  Neurological: She is alert and oriented to person, place, and time  No cranial nerve deficit  Coordination normal    Grossly, no focal motor deficits  Skin: Skin is warm and dry  No rash noted  She is not diaphoretic  Psychiatric: She has a normal mood and affect  Her behavior is normal    Vitals reviewed

## 2019-11-01 NOTE — TELEPHONE ENCOUNTER
The pt's butalbital-acetaminophen-caffeine [facility] to [facility] [address] which is now closed  [facility]?

## 2020-10-14 ENCOUNTER — ANNUAL EXAM (OUTPATIENT)
Dept: OBGYN CLINIC | Facility: CLINIC | Age: 35
End: 2020-10-14
Payer: COMMERCIAL

## 2020-10-14 VITALS
BODY MASS INDEX: 27.29 KG/M2 | HEIGHT: 66 IN | WEIGHT: 169.8 LBS | DIASTOLIC BLOOD PRESSURE: 64 MMHG | TEMPERATURE: 98.2 F | SYSTOLIC BLOOD PRESSURE: 118 MMHG

## 2020-10-14 DIAGNOSIS — Z01.419 ENCOUNTER FOR GYNECOLOGICAL EXAMINATION (GENERAL) (ROUTINE) WITHOUT ABNORMAL FINDINGS: Primary | ICD-10-CM

## 2020-10-14 PROBLEM — K64.4 EXTERNAL HEMORRHOIDS: Status: ACTIVE | Noted: 2020-10-14

## 2020-10-14 PROCEDURE — 99395 PREV VISIT EST AGE 18-39: CPT | Performed by: OBSTETRICS & GYNECOLOGY

## 2020-10-14 PROCEDURE — 1036F TOBACCO NON-USER: CPT | Performed by: OBSTETRICS & GYNECOLOGY

## 2020-11-13 ENCOUNTER — OFFICE VISIT (OUTPATIENT)
Dept: INTERNAL MEDICINE CLINIC | Facility: CLINIC | Age: 35
End: 2020-11-13
Payer: COMMERCIAL

## 2020-11-13 VITALS
OXYGEN SATURATION: 96 % | TEMPERATURE: 98.3 F | BODY MASS INDEX: 27.71 KG/M2 | WEIGHT: 172.4 LBS | HEIGHT: 66 IN | DIASTOLIC BLOOD PRESSURE: 82 MMHG | SYSTOLIC BLOOD PRESSURE: 128 MMHG | HEART RATE: 77 BPM

## 2020-11-13 DIAGNOSIS — M54.50 ACUTE RIGHT-SIDED LOW BACK PAIN WITHOUT SCIATICA: Primary | ICD-10-CM

## 2020-11-13 DIAGNOSIS — E03.8 SUBCLINICAL HYPOTHYROIDISM: ICD-10-CM

## 2020-11-13 DIAGNOSIS — Z00.00 ANNUAL PHYSICAL EXAM: ICD-10-CM

## 2020-11-13 DIAGNOSIS — E78.00 HYPERCHOLESTEREMIA: ICD-10-CM

## 2020-11-13 PROCEDURE — 3725F SCREEN DEPRESSION PERFORMED: CPT | Performed by: INTERNAL MEDICINE

## 2020-11-13 PROCEDURE — 99395 PREV VISIT EST AGE 18-39: CPT | Performed by: INTERNAL MEDICINE

## 2020-11-13 PROCEDURE — 1036F TOBACCO NON-USER: CPT | Performed by: INTERNAL MEDICINE

## 2020-11-13 PROCEDURE — 3008F BODY MASS INDEX DOCD: CPT | Performed by: INTERNAL MEDICINE

## 2020-11-13 PROCEDURE — 99213 OFFICE O/P EST LOW 20 MIN: CPT | Performed by: INTERNAL MEDICINE

## 2020-11-13 RX ORDER — METHOCARBAMOL 500 MG/1
500 TABLET, FILM COATED ORAL EVERY 8 HOURS PRN
Qty: 30 TABLET | Refills: 0 | Status: SHIPPED | OUTPATIENT
Start: 2020-11-13 | End: 2021-01-21

## 2020-12-09 ENCOUNTER — OFFICE VISIT (OUTPATIENT)
Dept: OBGYN CLINIC | Facility: MEDICAL CENTER | Age: 35
End: 2020-12-09
Payer: COMMERCIAL

## 2020-12-09 ENCOUNTER — APPOINTMENT (OUTPATIENT)
Dept: RADIOLOGY | Facility: MEDICAL CENTER | Age: 35
End: 2020-12-09
Payer: COMMERCIAL

## 2020-12-09 VITALS
HEART RATE: 67 BPM | HEIGHT: 66 IN | WEIGHT: 169 LBS | DIASTOLIC BLOOD PRESSURE: 86 MMHG | BODY MASS INDEX: 27.16 KG/M2 | SYSTOLIC BLOOD PRESSURE: 128 MMHG

## 2020-12-09 DIAGNOSIS — G89.29 CHRONIC BILATERAL LOW BACK PAIN WITHOUT SCIATICA: ICD-10-CM

## 2020-12-09 DIAGNOSIS — M54.50 CHRONIC BILATERAL LOW BACK PAIN WITHOUT SCIATICA: ICD-10-CM

## 2020-12-09 DIAGNOSIS — M54.50 CHRONIC BILATERAL LOW BACK PAIN WITHOUT SCIATICA: Primary | ICD-10-CM

## 2020-12-09 DIAGNOSIS — G89.29 CHRONIC BILATERAL LOW BACK PAIN WITHOUT SCIATICA: Primary | ICD-10-CM

## 2020-12-09 PROCEDURE — 72100 X-RAY EXAM L-S SPINE 2/3 VWS: CPT

## 2020-12-09 PROCEDURE — 3008F BODY MASS INDEX DOCD: CPT | Performed by: PHYSICAL MEDICINE & REHABILITATION

## 2020-12-09 PROCEDURE — 99204 OFFICE O/P NEW MOD 45 MIN: CPT | Performed by: PHYSICAL MEDICINE & REHABILITATION

## 2020-12-09 PROCEDURE — 1036F TOBACCO NON-USER: CPT | Performed by: PHYSICAL MEDICINE & REHABILITATION

## 2020-12-09 RX ORDER — METAXALONE 800 MG/1
800 TABLET ORAL 2 TIMES DAILY PRN
Qty: 40 TABLET | Refills: 0 | Status: SHIPPED | OUTPATIENT
Start: 2020-12-09 | End: 2020-12-09

## 2020-12-09 RX ORDER — MELOXICAM 15 MG/1
15 TABLET ORAL DAILY PRN
Qty: 60 TABLET | Refills: 0 | Status: SHIPPED | OUTPATIENT
Start: 2020-12-09 | End: 2021-01-21

## 2020-12-09 RX ORDER — LIDOCAINE 50 MG/G
1 PATCH TOPICAL DAILY
Qty: 30 PATCH | Refills: 0 | Status: SHIPPED | OUTPATIENT
Start: 2020-12-09 | End: 2021-01-21

## 2020-12-09 RX ORDER — CYCLOBENZAPRINE HCL 10 MG
TABLET ORAL
Qty: 1 TABLET | Refills: 0 | Status: SHIPPED | OUTPATIENT
Start: 2020-12-09 | End: 2021-01-21

## 2020-12-14 ENCOUNTER — EVALUATION (OUTPATIENT)
Dept: PHYSICAL THERAPY | Facility: REHABILITATION | Age: 35
End: 2020-12-14
Payer: COMMERCIAL

## 2020-12-14 DIAGNOSIS — G89.29 CHRONIC BILATERAL LOW BACK PAIN WITHOUT SCIATICA: ICD-10-CM

## 2020-12-14 DIAGNOSIS — M54.50 CHRONIC BILATERAL LOW BACK PAIN WITHOUT SCIATICA: ICD-10-CM

## 2020-12-14 PROCEDURE — 97110 THERAPEUTIC EXERCISES: CPT | Performed by: PHYSICAL THERAPIST

## 2020-12-15 PROCEDURE — 97162 PT EVAL MOD COMPLEX 30 MIN: CPT | Performed by: PHYSICAL THERAPIST

## 2020-12-17 ENCOUNTER — OFFICE VISIT (OUTPATIENT)
Dept: PHYSICAL THERAPY | Facility: REHABILITATION | Age: 35
End: 2020-12-17
Payer: COMMERCIAL

## 2020-12-17 DIAGNOSIS — M54.50 CHRONIC BILATERAL LOW BACK PAIN WITHOUT SCIATICA: Primary | ICD-10-CM

## 2020-12-17 DIAGNOSIS — G89.29 CHRONIC BILATERAL LOW BACK PAIN WITHOUT SCIATICA: Primary | ICD-10-CM

## 2020-12-17 PROCEDURE — 97110 THERAPEUTIC EXERCISES: CPT

## 2020-12-17 PROCEDURE — 97112 NEUROMUSCULAR REEDUCATION: CPT

## 2020-12-21 ENCOUNTER — OFFICE VISIT (OUTPATIENT)
Dept: PHYSICAL THERAPY | Facility: REHABILITATION | Age: 35
End: 2020-12-21
Payer: COMMERCIAL

## 2020-12-21 DIAGNOSIS — G89.29 CHRONIC BILATERAL LOW BACK PAIN WITHOUT SCIATICA: Primary | ICD-10-CM

## 2020-12-21 DIAGNOSIS — M54.50 CHRONIC BILATERAL LOW BACK PAIN WITHOUT SCIATICA: Primary | ICD-10-CM

## 2020-12-21 PROCEDURE — 97112 NEUROMUSCULAR REEDUCATION: CPT

## 2020-12-21 PROCEDURE — 97140 MANUAL THERAPY 1/> REGIONS: CPT

## 2020-12-21 PROCEDURE — 97110 THERAPEUTIC EXERCISES: CPT

## 2020-12-23 ENCOUNTER — OFFICE VISIT (OUTPATIENT)
Dept: PHYSICAL THERAPY | Facility: REHABILITATION | Age: 35
End: 2020-12-23
Payer: COMMERCIAL

## 2020-12-23 DIAGNOSIS — G89.29 CHRONIC BILATERAL LOW BACK PAIN WITHOUT SCIATICA: Primary | ICD-10-CM

## 2020-12-23 DIAGNOSIS — M54.50 CHRONIC BILATERAL LOW BACK PAIN WITHOUT SCIATICA: Primary | ICD-10-CM

## 2020-12-23 PROCEDURE — 97112 NEUROMUSCULAR REEDUCATION: CPT | Performed by: PHYSICAL THERAPIST

## 2020-12-23 PROCEDURE — 97140 MANUAL THERAPY 1/> REGIONS: CPT | Performed by: PHYSICAL THERAPIST

## 2020-12-23 PROCEDURE — 97110 THERAPEUTIC EXERCISES: CPT | Performed by: PHYSICAL THERAPIST

## 2020-12-28 ENCOUNTER — OFFICE VISIT (OUTPATIENT)
Dept: PHYSICAL THERAPY | Facility: REHABILITATION | Age: 35
End: 2020-12-28
Payer: COMMERCIAL

## 2020-12-28 DIAGNOSIS — G89.29 CHRONIC BILATERAL LOW BACK PAIN WITHOUT SCIATICA: Primary | ICD-10-CM

## 2020-12-28 DIAGNOSIS — M54.50 CHRONIC BILATERAL LOW BACK PAIN WITHOUT SCIATICA: Primary | ICD-10-CM

## 2020-12-28 PROCEDURE — 97140 MANUAL THERAPY 1/> REGIONS: CPT | Performed by: PHYSICAL THERAPIST

## 2020-12-28 PROCEDURE — 97110 THERAPEUTIC EXERCISES: CPT | Performed by: PHYSICAL THERAPIST

## 2020-12-28 PROCEDURE — 97112 NEUROMUSCULAR REEDUCATION: CPT | Performed by: PHYSICAL THERAPIST

## 2020-12-30 ENCOUNTER — OFFICE VISIT (OUTPATIENT)
Dept: PHYSICAL THERAPY | Facility: REHABILITATION | Age: 35
End: 2020-12-30
Payer: COMMERCIAL

## 2020-12-30 DIAGNOSIS — G89.29 CHRONIC BILATERAL LOW BACK PAIN WITHOUT SCIATICA: Primary | ICD-10-CM

## 2020-12-30 DIAGNOSIS — M54.50 CHRONIC BILATERAL LOW BACK PAIN WITHOUT SCIATICA: Primary | ICD-10-CM

## 2020-12-30 PROCEDURE — 97110 THERAPEUTIC EXERCISES: CPT

## 2020-12-30 PROCEDURE — 97140 MANUAL THERAPY 1/> REGIONS: CPT

## 2020-12-30 PROCEDURE — 97112 NEUROMUSCULAR REEDUCATION: CPT

## 2021-01-04 ENCOUNTER — OFFICE VISIT (OUTPATIENT)
Dept: PHYSICAL THERAPY | Facility: REHABILITATION | Age: 36
End: 2021-01-04
Payer: COMMERCIAL

## 2021-01-04 DIAGNOSIS — G89.29 CHRONIC BILATERAL LOW BACK PAIN WITHOUT SCIATICA: Primary | ICD-10-CM

## 2021-01-04 DIAGNOSIS — M54.50 CHRONIC BILATERAL LOW BACK PAIN WITHOUT SCIATICA: Primary | ICD-10-CM

## 2021-01-04 PROCEDURE — 97110 THERAPEUTIC EXERCISES: CPT

## 2021-01-04 PROCEDURE — 97140 MANUAL THERAPY 1/> REGIONS: CPT

## 2021-01-04 PROCEDURE — 97112 NEUROMUSCULAR REEDUCATION: CPT

## 2021-01-04 NOTE — PROGRESS NOTES
Daily Note     Today's date: 2021  Patient name: Yosef Mao  : 1985  MRN: [id number]  Referring provider: Antonio Marcos DO  Dx:   Encounter Diagnosis     ICD-10-CM    1  Chronic bilateral low back pain without sciatica  M54 5     G89 29                   Subjective:  L lower back & into L groin is bothering her today    She RTW today,  This may be from sitting more  She did not feel too bad over the weekend      Objective: See treatment diary below      Assessment: Tolerated treatment well  Patient would benefit from continued PT   Some discomfort with ANNA & planks  Felt better after treatment      Plan: Continue per plan of care        Precautions: none      Manuals       Psoas release B B CD B/L TE NT NT CD CD      SL PA L4 gr V      NT NT                                Neuro Re-Ed             TA 10"x5 10"x5  10"x5         Single k-c 5"x10 5" x10 10" x10 10"x10 10"x15 10"x15 10"x 10      Cat-cow 10x 10x 5"x10  5"x10 5" 15x 5" x15      TA supine marching  5" x10 5"x10 Bent leg lowering 2x5 x10 10x 10x      bridges  5"x 10 5"x10 5"x15 5"x20 wBTB 5" x20 w BTB 5" x20      TA w TB abd  BTB 5" x10 BTB 5"x10 5"x15 clamshells 2x10 clams 2x10 BTB clams 20x                   Ther Ex             Supine EOT hip flexor str 30"x2 30"x2 30"x3 1/2 kneel 30"x2 30"x2 30"x 2 30"x2      bike  5' 5' 7' 7' 7' 7'      On pball slouch & correct  5" x10           On pball TB DLS should ext  GTB 5" x10 gtb 5"x10 gtb 5"x20 GTB 5"x20 GTB 5" x20 BTB 5" x20      On pball abd and ER    5"x10 GTB 5"x20 GTB 5" 20x GTB 5" x20      On pball TB Y's    5"x10 GTB 5"x10 GTB 5" 10x GTB 5" x10      Prone on elbows     1 min 1' 1'      planks       10-15" x3      Ther Activity                                       Gait Training                                       Modalities               10'     10'

## 2021-01-06 ENCOUNTER — OFFICE VISIT (OUTPATIENT)
Dept: PHYSICAL THERAPY | Facility: REHABILITATION | Age: 36
End: 2021-01-06
Payer: COMMERCIAL

## 2021-01-06 DIAGNOSIS — M54.50 CHRONIC BILATERAL LOW BACK PAIN WITHOUT SCIATICA: Primary | ICD-10-CM

## 2021-01-06 DIAGNOSIS — G89.29 CHRONIC BILATERAL LOW BACK PAIN WITHOUT SCIATICA: Primary | ICD-10-CM

## 2021-01-06 PROCEDURE — 97112 NEUROMUSCULAR REEDUCATION: CPT

## 2021-01-06 PROCEDURE — 97110 THERAPEUTIC EXERCISES: CPT

## 2021-01-06 PROCEDURE — 97140 MANUAL THERAPY 1/> REGIONS: CPT

## 2021-01-06 NOTE — PROGRESS NOTES
Daily Note     Today's date: 2021  Patient name: Aylin Garcia  : 1985  MRN: [id number]  Referring provider: Anish Gonsalez DO  Dx:   No diagnosis found  Subjective:  L LBP, not really into groin today   Mild to moderate pain today          Objective: See treatment diary below      Assessment: Tolerated treatment well  Patient would benefit from continued PT     With asst NT PT  Gained more ext with SNAGS  She did feel better after treatment      Plan: Continue per plan of care        Precautions: none      Manuals  1-6     Psoas release B B CD B/L TE NT NT CD CD CD     SL PA L4 gr V      NT NT NT     SNAGs with flex and ext at L5        NT 2x5                  Neuro Re-Ed             TA 10"x5 10"x5  10"x5         Single k-c 5"x10 5" x10 10" x10 10"x10 10"x15 10"x15 10"x 10 D K-C 10"x10     Cat-cow 10x 10x 5"x10  5"x10 5" 15x 5" x15 5" x15     TA supine marching  5" x10 5"x10 Bent leg lowering 2x5 x10 10x 10x 10x & 1x5     bridges  5"x 10 5"x10 5"x15 5"x20 wBTB 5" x20 w BTB 5" x20 w BTB 5" 20x     TA w TB abd  BTB 5" x10 BTB 5"x10 5"x15 clamshells 2x10 clams 2x10 BTB clams 20x BTB    20x                  Ther Ex             Supine EOT hip flexor str 30"x2 30"x2 30"x3 1/2 kneel 30"x2 30"x2 30"x 2 30"x2 30"x2     bike  5' 5' 7' 7' 7' 7' 7'     On pball slouch & correct  5" x10           On pball TB DLS should ext  GTB 5" x10 gtb 5"x10 gtb 5"x20 GTB 5"x20 GTB 5" x20 BTB 5" x20 BTB    20x     On pball abd and ER    5"x10 GTB 5"x20 GTB 5" 20x GTB 5" x20 GTB    20x     On pball TB Y's    5"x10 GTB 5"x10 GTB 5" 10x GTB 5" x10 GTB    10x     Prone on elbows     1 min 1' 1' shallow press up  5x     planks       10-15" x3 10-15" x3     Ther Activity                                       Gait Training                                       Modalities               10'     10' 10'

## 2021-01-11 ENCOUNTER — APPOINTMENT (OUTPATIENT)
Dept: PHYSICAL THERAPY | Facility: REHABILITATION | Age: 36
End: 2021-01-11
Payer: COMMERCIAL

## 2021-01-13 ENCOUNTER — OFFICE VISIT (OUTPATIENT)
Dept: PHYSICAL THERAPY | Facility: REHABILITATION | Age: 36
End: 2021-01-13
Payer: COMMERCIAL

## 2021-01-13 DIAGNOSIS — M54.50 CHRONIC BILATERAL LOW BACK PAIN WITHOUT SCIATICA: Primary | ICD-10-CM

## 2021-01-13 DIAGNOSIS — G89.29 CHRONIC BILATERAL LOW BACK PAIN WITHOUT SCIATICA: Primary | ICD-10-CM

## 2021-01-13 PROCEDURE — 97140 MANUAL THERAPY 1/> REGIONS: CPT

## 2021-01-13 PROCEDURE — 97112 NEUROMUSCULAR REEDUCATION: CPT

## 2021-01-13 PROCEDURE — 97140 MANUAL THERAPY 1/> REGIONS: CPT | Performed by: PHYSICAL THERAPIST

## 2021-01-13 PROCEDURE — 97110 THERAPEUTIC EXERCISES: CPT

## 2021-01-13 NOTE — PROGRESS NOTES
Daily Note     Today's date: 2021  Patient name: Juli Gamboa  : 1985  MRN: [id number]  Referring provider: Araceli Escamilla DO  Dx:   Encounter Diagnosis     ICD-10-CM    1  Chronic bilateral low back pain without sciatica  M54 5     G89 29                   Subjective:  Pt reports that she is having pain in her R low back today -5/10  Notes that the side changes depending on the day  Pt notes that she has been complaint with her HEP  Objective: See treatment diary below      Assessment: Tolerated treatment well  Manuals were performed by PT, NT with improved pain free motion present but still was limited  She was mostly challenged with t band Y's but able to complete  Pt demonstrated decreased lumbar ext with prone press ups  Patient would benefit from continued PT  Ended with heat  Plan: Continue per plan of care        Precautions: none      Manuals  1-6 1-13    Psoas release B B CD B/L TE NT NT CD CD CD MM    SL PA L4 gr V      NT NT NT NT    SNAGs with flex and ext at L5        NT 2x5 NT 2x5                 Neuro Re-Ed             TA 10"x5 10"x5  10"x5         Single k-c 5"x10 5" x10 10" x10 10"x10 10"x15 10"x15 10"x 10 D K-C 10"x10 D K-C 10"x10    Cat-cow 10x 10x 5"x10  5"x10 5" 15x 5" x15 5" x15 5" x15    TA supine marching  5" x10 5"x10 Bent leg lowering 2x5 x10 10x 10x 10x & 1x5 10x & 1x5    bridges  5"x 10 5"x10 5"x15 5"x20 wBTB 5" x20 w BTB 5" x20 w BTB 5" 20x w BTB 5" 20x    TA w TB abd  BTB 5" x10 BTB 5"x10 5"x15 clamshells 2x10 clams 2x10 BTB clams 20x BTB    20x BTB 20x                 Ther Ex             Supine EOT hip flexor str 30"x2 30"x2 30"x3 1/2 kneel 30"x2 30"x2 30"x 2 30"x2 30"x2 30"x2    bike  5' 5' 7' 7' 7' 7' 7' TM 7'    On pball slouch & correct  5" x10           On pball TB DLS should ext  GTB 5" x10 gtb 5"x10 gtb 5"x20 GTB 5"x20 GTB 5" x20 BTB 5" x20 BTB    20x BTB 20x    On pball abd and ER    5"x10 GTB 5"x20 GTB 5" 20x GTB 5" x20 GTB    20x GTB 20x    On pball TB Y's    5"x10 GTB 5"x10 GTB 5" 10x GTB 5" x10 GTB    10x GTB 5" 15x    Prone on elbows     1 min 1' 1' shallow press up  5x shallow 10x, 10x w/ OP    planks       10-15" x3 10-15" x3 15"x3    Ther Activity                                       Gait Training                                       Modalities               10'     10' 10' 10'

## 2021-01-21 ENCOUNTER — OFFICE VISIT (OUTPATIENT)
Dept: OBGYN CLINIC | Facility: MEDICAL CENTER | Age: 36
End: 2021-01-21
Payer: COMMERCIAL

## 2021-01-21 VITALS
WEIGHT: 169 LBS | HEART RATE: 83 BPM | HEIGHT: 66 IN | DIASTOLIC BLOOD PRESSURE: 84 MMHG | SYSTOLIC BLOOD PRESSURE: 140 MMHG | BODY MASS INDEX: 27.16 KG/M2

## 2021-01-21 DIAGNOSIS — G89.29 CHRONIC BILATERAL LOW BACK PAIN WITHOUT SCIATICA: Primary | ICD-10-CM

## 2021-01-21 DIAGNOSIS — M54.50 CHRONIC BILATERAL LOW BACK PAIN WITHOUT SCIATICA: Primary | ICD-10-CM

## 2021-01-21 PROCEDURE — 1036F TOBACCO NON-USER: CPT | Performed by: PHYSICAL MEDICINE & REHABILITATION

## 2021-01-21 PROCEDURE — 3008F BODY MASS INDEX DOCD: CPT | Performed by: PHYSICAL MEDICINE & REHABILITATION

## 2021-01-21 PROCEDURE — 99214 OFFICE O/P EST MOD 30 MIN: CPT | Performed by: PHYSICAL MEDICINE & REHABILITATION

## 2021-01-21 RX ORDER — PREDNISONE 20 MG/1
40 TABLET ORAL
Qty: 10 TABLET | Refills: 0 | Status: SHIPPED | OUTPATIENT
Start: 2021-01-21 | End: 2021-01-26

## 2021-01-21 RX ORDER — GABAPENTIN 300 MG/1
300 CAPSULE ORAL
Qty: 60 CAPSULE | Refills: 1 | Status: SHIPPED | OUTPATIENT
Start: 2021-01-21 | End: 2021-10-20

## 2021-01-21 NOTE — PATIENT INSTRUCTIONS
We will see you back after the MRI to discuss the results and next steps  You have been prescribed a medication called gabapentin  This is a medication that needs to be taken on a consistent basis to work optimally  It is not a medication that you take "as needed "      Typically, it takes 2-4 weeks to get adequate pain relief from taking gabapentin  Typical side effects can include drowsiness and tiredness  This is why it is started at night and slowly increased until your pain is controlled  Do not operate heavy machinery until you know that you are tolerating this medication without side effects  You will continue this medication as prescribed until your follow up visit  Your pharmacist will also go over this important information with you

## 2021-01-21 NOTE — PROGRESS NOTES
1  Chronic bilateral low back pain without sciatica  predniSONE 20 mg tablet    gabapentin (NEURONTIN) 300 mg capsule    MRI lumbar spine wo contrast     Orders Placed This Encounter   Procedures    MRI lumbar spine wo contrast        Imaging Studies (I personally reviewed images in PACS and report if it was available):  Lumbar spine x-rays that are most recent to this encounter were reviewed  These images show L4-5 and L5-S1 disc space narrowing  There is lower lumbar facet hypertrophy  There is also a limbus vertebra along the mel-superior L5 vertebral body  No acute osseous abnormalities      Impression:  The patient's pain is multifactorial and is predominantly due to bilateral sacroiliac joint dysfunction  There are no concerning neurological deficits      We discussed different treatment options and decided to proceed with metaxalone, meloxicam, diclofenac gel and lidocaine patches on her previous visit  She has also been going to physical therapy for quite some time now  She has had no improvement in her symptoms  We discussed different treatment options will proceed with a prednisone burst along with gabapentin that she will slowly increase as tolerated  We will also order an MRI of her lumbar spine  Can consider an inflammatory workup on her follow-up visit depending on how she feels  Return for Follow-up after MRI  HPI:  Asa Bryson is a 38 y o  female  who presents in follow up  Here for   Chief Complaint   Patient presents with    Spine - Follow-up       Date of injury: Chronic pain for the last 16 years  There was no injury  Trajectory of symptoms:  No change since her last visit  She has been to physical therapy for quite some time and this did not help and nor did the medications she was prescribed  Review of Systems   Constitutional: Positive for activity change  Negative for fever  HENT: Negative for trouble swallowing      Eyes: Negative for visual disturbance  Respiratory: Negative for shortness of breath  Cardiovascular: Negative for chest pain  Gastrointestinal: Negative for abdominal pain  Denies bowel incontinence  Endocrine: Negative for polydipsia  Genitourinary:        Denies urinary incontinence  Musculoskeletal: Positive for back pain  Negative for gait problem  Skin: Negative for rash  Allergic/Immunologic: Negative for immunocompromised state  Neurological: Negative for weakness and numbness  Denies saddle anesthesia  Hematological: Does not bruise/bleed easily  Psychiatric/Behavioral: Negative for confusion  Following history reviewed and updated:  History reviewed  No pertinent past medical history    Past Surgical History:   Procedure Laterality Date    TOOTH EXTRACTION       Social History   Social History     Substance and Sexual Activity   Alcohol Use Yes    Alcohol/week: 2 0 standard drinks    Types: 2 Glasses of wine per week    Frequency: 2-3 times a week    Drinks per session: 1 or 2    Binge frequency: Never    Comment: 6 glasses a week     Social History     Substance and Sexual Activity   Drug Use No     Social History     Tobacco Use   Smoking Status Never Smoker   Smokeless Tobacco Never Used     Family History   Problem Relation Age of Onset    Hypothyroidism Mother     Coronary artery disease Father     Heart attack Father     Hypertension Father     Lung cancer Paternal Grandmother     Hearing loss Daughter         deaf in right ear, microtia of right ear    Depression Family     Post-traumatic stress disorder Family     Anxiety disorder Family         symptom    Migraines Family     Thyroid disease Family         thyroid disorder     No Known Problems Sister     No Known Problems Sister     Lung cancer Maternal Grandmother     Diabetes Maternal Grandfather      Allergies   Allergen Reactions    Pollen Extract Allergic Rhinitis        Constitutional:  /84 (BP Location: Right arm, Patient Position: Sitting, Cuff Size: Standard)   Pulse 83   Ht 5' 5 5" (1 664 m)   Wt 76 7 kg (169 lb)   BMI 27 70 kg/m²    General: NAD  Eyes: Clear sclerae  ENT: No inflammation, lesion, or mass of lips  No tracheal deviation  Musculoskeletal: As mentioned below  Integumentary: No visible rashes or skin lesions  Pulmonary/Chest: Effort normal  No respiratory distress  Neuro: CN's grossly intact, GUTIERREZ  Psych: Normal affect and judgement  Vascular: WWP  Back Exam     Tenderness   The patient is experiencing tenderness in the lumbar and sacroiliac  Range of Motion   The patient has normal back ROM  Back extension: Pain at end range  Muscle Strength   The patient has normal back strength  Tests   Straight leg raise right: negative  Straight leg raise left: negative    Other   Sensation: normal  Gait: normal   Erythema: no back redness  Scars: absent    Comments:  Positive oblique hyper extension test bilaterally  Positive Amador test bilaterally    Normal sacral compression test              Procedures

## 2021-01-29 ENCOUNTER — APPOINTMENT (OUTPATIENT)
Dept: PHYSICAL THERAPY | Facility: REHABILITATION | Age: 36
End: 2021-01-29
Payer: COMMERCIAL

## 2021-01-30 ENCOUNTER — TRANSCRIBE ORDERS (OUTPATIENT)
Dept: ADMINISTRATIVE | Facility: HOSPITAL | Age: 36
End: 2021-01-30

## 2021-01-30 DIAGNOSIS — M54.50 LOW BACK PAIN: Primary | ICD-10-CM

## 2021-02-03 ENCOUNTER — TELEPHONE (OUTPATIENT)
Dept: OBGYN CLINIC | Facility: OTHER | Age: 36
End: 2021-02-03

## 2021-02-03 DIAGNOSIS — F40.240 CLAUSTROPHOBIA: Primary | ICD-10-CM

## 2021-02-03 RX ORDER — LORAZEPAM 1 MG/1
1 TABLET ORAL
Qty: 1 TABLET | Refills: 0 | Status: SHIPPED | OUTPATIENT
Start: 2021-02-03 | End: 2021-10-20

## 2021-02-03 NOTE — TELEPHONE ENCOUNTER
Patient called in stating she went for her MRI but had a minor panic attack and was wondering if you could prescribe her something for her next MRI which is Monday Night 02/08      I verified her pharmacy     C/b # [phone]

## 2021-02-08 ENCOUNTER — HOSPITAL ENCOUNTER (OUTPATIENT)
Dept: RADIOLOGY | Facility: HOSPITAL | Age: 36
Discharge: HOME | End: 2021-02-08
Attending: PHYSICAL MEDICINE & REHABILITATION
Payer: COMMERCIAL

## 2021-02-08 DIAGNOSIS — M54.50 LOW BACK PAIN: ICD-10-CM

## 2021-02-08 PROCEDURE — 72148 MRI LUMBAR SPINE W/O DYE: CPT

## 2021-02-08 PROCEDURE — G1004 CDSM NDSC: HCPCS

## 2021-02-17 ENCOUNTER — OFFICE VISIT (OUTPATIENT)
Dept: OBGYN CLINIC | Facility: MEDICAL CENTER | Age: 36
End: 2021-02-17
Payer: COMMERCIAL

## 2021-02-17 VITALS
HEART RATE: 73 BPM | HEIGHT: 66 IN | SYSTOLIC BLOOD PRESSURE: 122 MMHG | DIASTOLIC BLOOD PRESSURE: 80 MMHG | WEIGHT: 152 LBS | BODY MASS INDEX: 24.43 KG/M2

## 2021-02-17 DIAGNOSIS — M47.816 FACET HYPERTROPHY OF LUMBAR REGION: Primary | ICD-10-CM

## 2021-02-17 DIAGNOSIS — M54.50 CHRONIC BILATERAL LOW BACK PAIN WITHOUT SCIATICA: ICD-10-CM

## 2021-02-17 DIAGNOSIS — G89.29 CHRONIC BILATERAL LOW BACK PAIN WITHOUT SCIATICA: ICD-10-CM

## 2021-02-17 PROCEDURE — 3008F BODY MASS INDEX DOCD: CPT | Performed by: PHYSICAL MEDICINE & REHABILITATION

## 2021-02-17 PROCEDURE — 1036F TOBACCO NON-USER: CPT | Performed by: PHYSICAL MEDICINE & REHABILITATION

## 2021-02-17 PROCEDURE — 99213 OFFICE O/P EST LOW 20 MIN: CPT | Performed by: PHYSICAL MEDICINE & REHABILITATION

## 2021-02-17 NOTE — PROGRESS NOTES
1  Facet hypertrophy of lumbar region  Ambulatory referral to Physical Therapy   2  Chronic bilateral low back pain without sciatica  Ambulatory referral to Physical Therapy     Orders Placed This Encounter   Procedures    Ambulatory referral to Physical Therapy        Impression:  Patient is here in follow up of chronic low back pain likely due to bilateral sacroiliac joint dysfunction   There are no concerning neurological deficits  We reviewed her MRI today      Patient presents after using metaxalone, meloxicam, diclofenac gel and lidocaine patches along with going through physical therapy for quite some time now  She did not start the prednisone or gabapentin because she feels like nothing works  She will start prednisone  She can hold off on gabapentin  We will have her start PT at [facility]  If after a few weeks she still has pain, can consider having her see pain management  No follow-ups on file  HPI:  Paula Sandhu is a 38 y o  female  who presents in follow up  Here for   Chief Complaint   Patient presents with    Spine - Follow-up       Date of injury: Chronic  Trajectory of symptoms: No changes  Review of Systems   Constitutional: Positive for activity change  Negative for fever  HENT: Negative for trouble swallowing  Eyes: Negative for visual disturbance  Respiratory: Negative for shortness of breath  Cardiovascular: Negative for chest pain  Gastrointestinal: Negative for abdominal pain  Denies bowel incontinence  Endocrine: Negative for polydipsia  Genitourinary:        Denies urinary incontinence  Musculoskeletal: Positive for back pain  Negative for gait problem  Skin: Negative for rash  Allergic/Immunologic: Negative for immunocompromised state  Neurological: Negative for weakness and numbness  Denies saddle anesthesia  Hematological: Does not bruise/bleed easily  Psychiatric/Behavioral: Negative for confusion         Following history reviewed and updated:  History reviewed  No pertinent past medical history  Past Surgical History:   Procedure Laterality Date    TOOTH EXTRACTION       Social History   Social History     Substance and Sexual Activity   Alcohol Use Yes    Alcohol/week: 2 0 standard drinks    Types: 2 Glasses of wine per week    Frequency: 2-3 times a week    Drinks per session: 1 or 2    Binge frequency: Never    Comment: 6 glasses a week     Social History     Substance and Sexual Activity   Drug Use No     Social History     Tobacco Use   Smoking Status Never Smoker   Smokeless Tobacco Never Used     Family History   Problem Relation Age of Onset    Hypothyroidism Mother     Coronary artery disease Father     Heart attack Father     Hypertension Father     Lung cancer Paternal Grandmother     Hearing loss Daughter         deaf in right ear, microtia of right ear    Depression Family     Post-traumatic stress disorder Family     Anxiety disorder Family         symptom    Migraines Family     Thyroid disease Family         thyroid disorder     No Known Problems Sister     No Known Problems Sister     Lung cancer Maternal Grandmother     Diabetes Maternal Grandfather      Allergies   Allergen Reactions    Pollen Extract Allergic Rhinitis        Constitutional:  /80 (BP Location: Right arm, Patient Position: Sitting, Cuff Size: Standard)   Pulse 73   Ht 5' 6" (1 676 m)   Wt 68 9 kg (152 lb)   LMP 01/29/2021 (Exact Date)   BMI 24 53 kg/m²    General: NAD  Eyes: Clear sclerae  ENT: No inflammation, lesion, or mass of lips  No tracheal deviation  Musculoskeletal: As mentioned below  Integumentary: No visible rashes or skin lesions  Pulmonary/Chest: Effort normal  No respiratory distress  Neuro: CN's grossly intact, GUTIERREZ  Psych: Normal affect and judgement  Vascular: WWP  Back Exam     Tenderness   The patient is experiencing tenderness in the lumbar and sacroiliac      Range of Motion Extension: abnormal   Flexion: normal     Muscle Strength   The patient has normal back strength  Other   Sensation: normal  Gait: normal   Erythema: no back redness  Scars: absent    Comments:  Positive Arevalo's maneuver  Procedures    Imaging Studies (I personally reviewed images in PACS and report):  Lumbar spine x-rays that are most recent to this encounter were reviewed   These images show L4-5 and L5-S1 disc space narrowing   There is lower lumbar facet hypertrophy  Sulema Zamarripa is also a limbus vertebra along the mel-superior L5 vertebral body   No acute osseous abnormalities  MRI of the lumbar spine dated 2/8/2021:  "VERTEBRAL BODIES:  There are 5 lumbar type vertebral bodies  Normal alignment of the lumbar spine  No spondylolysis or spondylolisthesis  No scoliosis  No compression fracture  There is Modic type I degenerative edema at L4-5 opposing endplates      SACRUM:  Normal signal within the sacrum  No evidence of insufficiency or stress fracture      DISTAL CORD AND CONUS:  Normal size and signal within the distal cord and conus      PARASPINAL SOFT TISSUES:  Paraspinal soft tissues are unremarkable      LOWER THORACIC DISC SPACES:  Normal disc height and signal   No disc herniation, canal stenosis or foraminal narrowing      LUMBAR DISC SPACES:     L1-L2:  No disc bulge  No canal or foraminal stenosis      L2-L3:  No disc bulge  No canal or foraminal stenosis        L3-L4:  Minimal disc bulge  No canal or foraminal stenosis      L4-L5:  There is degenerative loss of disc height and signal   There is disc bulge and moderate bilateral facet arthropathy  There is mild lateral recesses and minimal canal stenosis  Mild bilateral foraminal narrowing      L5-S1:  There is mild degenerative loss of disc height and signal   There is disc bulge with central annular fissure  Minimal bilateral facet arthropathy  There is no significant canal stenosis    Minimal bilateral foraminal narrowing      IMPRESSION:     Mild degenerative changes at levels L4-5 and L5-S1, slightly progressed from 9/10/2004    No high-grade canal or foraminal stenosis "

## 2021-02-22 NOTE — PROGRESS NOTES
Pt f/u with her doctor and will be referred to [facility] PT office

## 2021-03-01 ENCOUNTER — EVALUATION (OUTPATIENT)
Dept: PHYSICAL THERAPY | Facility: MEDICAL CENTER | Age: 36
End: 2021-03-01
Payer: COMMERCIAL

## 2021-03-01 DIAGNOSIS — G89.29 CHRONIC BILATERAL LOW BACK PAIN WITHOUT SCIATICA: ICD-10-CM

## 2021-03-01 DIAGNOSIS — M47.816 FACET HYPERTROPHY OF LUMBAR REGION: ICD-10-CM

## 2021-03-01 DIAGNOSIS — M54.50 CHRONIC BILATERAL LOW BACK PAIN WITHOUT SCIATICA: ICD-10-CM

## 2021-03-01 PROCEDURE — 97161 PT EVAL LOW COMPLEX 20 MIN: CPT | Performed by: PHYSICAL THERAPIST

## 2021-03-01 PROCEDURE — 97140 MANUAL THERAPY 1/> REGIONS: CPT | Performed by: PHYSICAL THERAPIST

## 2021-03-01 NOTE — PROGRESS NOTES
PT Evaluation     Today's date: 3/1/2021  Patient name: Fan Carr  : 1985  MRN: [id number]  Referring provider: Coni Parekh DO  Dx:   Encounter Diagnosis     ICD-10-CM    1  Chronic bilateral low back pain without sciatica  M54 5 Ambulatory referral to Physical Therapy    G89 29    2  Facet hypertrophy of lumbar region  M47 816 Ambulatory referral to Physical Therapy                  Assessment  Assessment details: Fan Carr is a 38 y o  female who presents with Chronic bilateral low back pain without sciatica  Facet hypertrophy of lumbar region  Patient presents alert and oriented with the above impairments  Mey Rojas will benefit from PT to addres deficits in order to maximize and return to prior level of function  No further referral appears necessary at this time based upon examination results  Prognosis is good given HEP compliance  Please contact me if you have any questions or recommendations  L anterior rotation was corrected today w/ scissor MET; she was educated on diagnosis and treatment approach to increase core stabilization while decreasing muscle restriction  Impairments: abnormal muscle tone, abnormal or restricted ROM, abnormal movement, activity intolerance, impaired physical strength, lacks appropriate home exercise program, pain with function and poor body mechanics  Understanding of Dx/Px/POC: good   Prognosis: good    Goals  Short Term Goals:   1  Pain decreased 2 ratings in 4 weeks  2  ROM increased 10* in 4 weeks  3  Strength increased 1/2 grade in 4 weeks    Long Term Goals:   1  ADLS/IADLS in related activities improved to maximal level in 8 weeks  2  Work performance improved to maximal level in 8 weeks  3  Standing is improved to maximal level in 8 weeks  4   [place] with HEP in 8 weeks      Plan  Patient would benefit from: skilled physical therapy  Planned modality interventions: cryotherapy  Planned therapy interventions: abdominal trunk stabilization, flexibility, therapeutic exercise, stretching, strengthening, patient education, neuromuscular re-education, manual therapy and home exercise program  Frequency: 1x week  Duration in weeks: 8  Treatment plan discussed with: patient        Subjective Evaluation    History of Present Illness  Mechanism of injury: Pt reports chronic LBP for several years  Pain has progressively worsened and does radiate into hips w/ left side more painful recently than right  She has tried chiropractor care and massage which has not provided any long term relief  She began seeing ortho in December and was prescribed medication which did not provide relief  She was also referred to PT which did not provide any relief  Upon return to ortho she was prescribed steroid which did provide some relief and was referred back to PT  She presents today w/ reports of constant pain across LB, L worse than R  She reports increased pain w/ prolonged sitting, prolonged standing/walking, bending, excess physical activity  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  She did have MRI that revealed mild degenerative changes  She denies radicular symptoms  Pain  At best pain rating: 3  At worst pain rating: 10    Patient Goals  Patient goals for therapy: decreased pain, increased motion, increased strength, independence with ADLs/IADLs and return to work          Objective     Palpation     Additional Palpation Details  Tenderness over L piriformis, glute med, and QL    Active Range of Motion     Lumbar   Flexion: 15 degrees   Extension: 5 degrees     Strength/Myotome Testing     Left Hip   Planes of Motion   Flexion: 4+  Extension: 3-  Abduction: 4    Right Hip   Planes of Motion   Flexion: 3+  Extension: 3-  Abduction: 4+    Tests     Left Hip   Positive long sit       Additional Tests Details  L anterior torsion      Flowsheet Rows      Most Recent Value   PT/OT G-Codes   Current Score  47 Projected Score  62             Precautions: none      Manuals 3/1            Scissor MET to correct L ant torsion 5 min            TPR L glute med, piriformis, QL 5 min                                      Neuro Re-Ed 3/1            ppt 5"x20            bridges 5"x20            clamshells 5"x20            Ab brace w/ SLR nv            Prone hip ext nv                                      Ther Ex 3/1            Piriformis stretch nv            Hamstring stretch nv                                                                                          Ther Activity                                       Gait Training                                       Modalities

## 2021-03-09 ENCOUNTER — OFFICE VISIT (OUTPATIENT)
Dept: PHYSICAL THERAPY | Facility: MEDICAL CENTER | Age: 36
End: 2021-03-09
Payer: COMMERCIAL

## 2021-03-09 DIAGNOSIS — M47.816 FACET HYPERTROPHY OF LUMBAR REGION: ICD-10-CM

## 2021-03-09 DIAGNOSIS — G89.29 CHRONIC BILATERAL LOW BACK PAIN WITHOUT SCIATICA: Primary | ICD-10-CM

## 2021-03-09 DIAGNOSIS — M54.50 CHRONIC BILATERAL LOW BACK PAIN WITHOUT SCIATICA: Primary | ICD-10-CM

## 2021-03-09 PROCEDURE — 97112 NEUROMUSCULAR REEDUCATION: CPT | Performed by: PHYSICAL THERAPIST

## 2021-03-09 PROCEDURE — 97140 MANUAL THERAPY 1/> REGIONS: CPT | Performed by: PHYSICAL THERAPIST

## 2021-03-09 NOTE — PROGRESS NOTES
Daily Note     Today's date: 3/9/2021  Patient name: Marie Smith  : 1985  MRN: [id number]  Referring provider: Mariela Park DO  Dx:   Encounter Diagnosis     ICD-10-CM    1  Chronic bilateral low back pain without sciatica  M54 5     G89 29    2  Facet hypertrophy of lumbar region  M47 816                   Subjective: Pt reports no pain upon awakening the morning after IE and corrected SIJ torsion  Since then she has developed some mild pain on the R side  No issues w/ HEP  Mild pain only today  Objective: See treatment diary below      Assessment: Tolerated treatment well  Patient demonstrated fatigue post treatment, exhibited good technique with therapeutic exercises and would benefit from continued PT  Negative long sit test today  Decreased palpable tightness  Updated HEP today  Demonstrates good core control  Plan: Continue per plan of care        Precautions: none      Manuals 3/1 3/9           Scissor MET to correct L ant torsion 5 min np           TPR L glute med, piriformis, QL 5 min B 15 min                                     Neuro Re-Ed 3/1 3/9           ppt 5"x20 5"x20           bridges 5"x20 5"x20           clamshells 5"x20 5"x20           Ab brace w/ SLR nv x20 ea           Prone hip ext nv x20 ea                                     Ther Ex 3/1 3/9           Piriformis stretch nv nv           Hamstring stretch nv nv                                                                                         Ther Activity                                       Gait Training                                       Modalities

## 2021-03-16 ENCOUNTER — OFFICE VISIT (OUTPATIENT)
Dept: PHYSICAL THERAPY | Facility: MEDICAL CENTER | Age: 36
End: 2021-03-16
Payer: COMMERCIAL

## 2021-03-16 DIAGNOSIS — M47.816 FACET HYPERTROPHY OF LUMBAR REGION: ICD-10-CM

## 2021-03-16 DIAGNOSIS — G89.29 CHRONIC BILATERAL LOW BACK PAIN WITHOUT SCIATICA: Primary | ICD-10-CM

## 2021-03-16 DIAGNOSIS — M54.50 CHRONIC BILATERAL LOW BACK PAIN WITHOUT SCIATICA: Primary | ICD-10-CM

## 2021-03-16 PROCEDURE — 97140 MANUAL THERAPY 1/> REGIONS: CPT | Performed by: PHYSICAL THERAPIST

## 2021-03-16 NOTE — PROGRESS NOTES
Daily Note     Today's date: 3/16/2021  Patient name: Charlene Miller  : 1985  MRN: [id number]  Referring provider: Abhishek Morales DO  Dx:   Encounter Diagnosis     ICD-10-CM    1  Chronic bilateral low back pain without sciatica  M54 5     G89 29    2  Facet hypertrophy of lumbar region  M47 816                   Subjective: Pt continued to deny significant pain  Most tightness present today in R lower thoracic region  Objective: See treatment diary below      Assessment: Tolerated treatment well  Patient demonstrated fatigue post treatment, exhibited good technique with therapeutic exercises and would benefit from continued PT  Negative long sit test remains negative  Significant muscle tightness present over B lower thoracic paraspinals  Decreased tightness in QL and piriformis  Plan: Continue per plan of care        Precautions: none      Manuals 3/1 3/9 3/16          Scissor MET to correct L ant torsion 5 min np np          TPR L glute med, piriformis, QL 5 min B 15 min 10 min B          [place]aston/TPR B paraspinals   15 min                       Neuro Re-Ed 3/1 3/9 3/16          ppt 5"x20 5"x20 hep          bridges 5"x20 5"x20 5"x20          clamshells 5"x20 5"x20 5"x20          Ab brace w/ SLR nv x20 ea x20          Prone hip ext nv x20 ea x20                                    Ther Ex 3/1 3/9 3/16          Piriformis stretch nv nv 30"x3          Hamstring stretch nv nv 30"x3                                                                                        Ther Activity                                       Gait Training                                       Modalities

## 2021-03-23 ENCOUNTER — OFFICE VISIT (OUTPATIENT)
Dept: PHYSICAL THERAPY | Facility: MEDICAL CENTER | Age: 36
End: 2021-03-23
Payer: COMMERCIAL

## 2021-03-23 DIAGNOSIS — M47.816 FACET HYPERTROPHY OF LUMBAR REGION: ICD-10-CM

## 2021-03-23 DIAGNOSIS — M54.50 CHRONIC BILATERAL LOW BACK PAIN WITHOUT SCIATICA: Primary | ICD-10-CM

## 2021-03-23 DIAGNOSIS — G89.29 CHRONIC BILATERAL LOW BACK PAIN WITHOUT SCIATICA: Primary | ICD-10-CM

## 2021-03-23 PROCEDURE — 97140 MANUAL THERAPY 1/> REGIONS: CPT | Performed by: PHYSICAL THERAPIST

## 2021-03-23 PROCEDURE — 97112 NEUROMUSCULAR REEDUCATION: CPT | Performed by: PHYSICAL THERAPIST

## 2021-03-23 NOTE — PROGRESS NOTES
Daily Note     Today's date: 3/23/2021  Patient name: Cisco Valdivia  : 1985  MRN: [id number]  Referring provider: Fuentes Grove DO  Dx:   Encounter Diagnosis     ICD-10-CM    1  Chronic bilateral low back pain without sciatica  M54 5     G89 29    2  Facet hypertrophy of lumbar region  M47 816                   Subjective: Pt reports tightness present on R side today  Objective: See treatment diary below      Assessment: Tolerated treatment well  Patient demonstrated fatigue post treatment, exhibited good technique with therapeutic exercises and would benefit from continued PT  Long sit test remains negative  Most restrictions present over B QL today  Demonstrates good core control    Plan: Continue per plan of care        Precautions: none      Manuals 3/1 3/9 3/16 3/23         Scissor MET to correct L ant torsion 5 min np np np         TPR L glute med, piriformis, QL 5 min B 15 min 10 min B np         Graston/TPR B paraspinals   15 min 20 min                      Neuro Re-Ed 3/1 3/9 3/16 3/23         ppt 5"x20 5"x20 hep hep         bridges 5"x20 5"x20 5"x20 5"x20         clamshells 5"x20 5"x20 5"x20 5"x20         Ab brace w/ SLR nv x20 ea x20 x20         Prone hip ext nv x20 ea x20 x20                                   Ther Ex 3/1 3/9 3/16 3/23         Piriformis stretch nv nv 30"x3 30"x3         Hamstring stretch nv nv 30"x3 30"x3                                                                                       Ther Activity                                       Gait Training                                       Modalities

## 2021-03-30 ENCOUNTER — OFFICE VISIT (OUTPATIENT)
Dept: PHYSICAL THERAPY | Facility: MEDICAL CENTER | Age: 36
End: 2021-03-30
Payer: COMMERCIAL

## 2021-03-30 DIAGNOSIS — M47.816 FACET HYPERTROPHY OF LUMBAR REGION: ICD-10-CM

## 2021-03-30 DIAGNOSIS — G89.29 CHRONIC BILATERAL LOW BACK PAIN WITHOUT SCIATICA: Primary | ICD-10-CM

## 2021-03-30 DIAGNOSIS — M54.50 CHRONIC BILATERAL LOW BACK PAIN WITHOUT SCIATICA: Primary | ICD-10-CM

## 2021-03-30 PROCEDURE — 97140 MANUAL THERAPY 1/> REGIONS: CPT | Performed by: PHYSICAL THERAPIST

## 2021-03-30 PROCEDURE — 97112 NEUROMUSCULAR REEDUCATION: CPT | Performed by: PHYSICAL THERAPIST

## 2021-03-30 NOTE — PROGRESS NOTES
Daily Note     Today's date: 3/30/2021  Patient name: Brenda Yates  : 1985  MRN: [id number]  Referring provider: Juli Payne DO  Dx:   Encounter Diagnosis     ICD-10-CM    1  Chronic bilateral low back pain without sciatica  M54 5     G89 29    2  Facet hypertrophy of lumbar region  M47 816                   Subjective:   Pt reports overall improvement since initiating PT  She does c/o persistent tightness in upper lumbar region; R worse than L today  Feels stiffness w/ moving into standing after prolonged sitting  Objective: See treatment diary below      Assessment: Tolerated treatment well  Patient demonstrated fatigue post treatment, exhibited good technique with therapeutic exercises and would benefit from continued PT  Long sit test remains negative  Core stability continues to improve; Tolerated progressions very well  Tightness is persisting in upper lumbar musculature bilaterally  Plan: Continue per plan of care        Precautions: none      Manuals 3/1 3/9 3/16 3/23 3/30        Scissor MET to correct L ant torsion 5 min np np np np        TPR L glute med, piriformis, QL 5 min B 15 min 10 min B np 10 min        [place]aston/TPR B paraspinals   15 min 20 min 15 min                     Neuro Re-Ed 3/1 3/9 3/16 3/23 3/30        ppt 5"x20 5"x20 hep hep hep        bridges 5"x20 5"x20 5"x20 5"x20 5"x10        clamshells 5"x20 5"x20 5"x20 5"x20 RTB 5"x20        Ab brace w/ SLR nv x20 ea x20 x20 x20        Prone hip ext nv x20 ea x20 x20 x20        Bridges w/ alt kicks     x10                     Ther Ex 3/1 3/9 3/16 3/23 3/30        Piriformis stretch nv nv 30"x3 30"x3 30"x3        Hamstring stretch nv nv 30"x3 30"x3 30"x3                                                                                      Ther Activity                                       Gait Training                                       Modalities

## 2021-04-06 ENCOUNTER — OFFICE VISIT (OUTPATIENT)
Dept: PHYSICAL THERAPY | Facility: MEDICAL CENTER | Age: 36
End: 2021-04-06
Payer: COMMERCIAL

## 2021-04-06 DIAGNOSIS — M47.816 FACET HYPERTROPHY OF LUMBAR REGION: ICD-10-CM

## 2021-04-06 DIAGNOSIS — G89.29 CHRONIC BILATERAL LOW BACK PAIN WITHOUT SCIATICA: Primary | ICD-10-CM

## 2021-04-06 DIAGNOSIS — M54.50 CHRONIC BILATERAL LOW BACK PAIN WITHOUT SCIATICA: Primary | ICD-10-CM

## 2021-04-06 PROCEDURE — 97112 NEUROMUSCULAR REEDUCATION: CPT | Performed by: PHYSICAL THERAPIST

## 2021-04-06 PROCEDURE — 97140 MANUAL THERAPY 1/> REGIONS: CPT | Performed by: PHYSICAL THERAPIST

## 2021-04-06 NOTE — PROGRESS NOTES
Daily Note     Today's date: 2021  Patient name: Shasta Eisenmenger  : 1985  MRN: [id number]  Referring provider: Amairani Webb DO  Dx:   Encounter Diagnosis     ICD-10-CM    1  Chronic bilateral low back pain without sciatica  M54 5     G89 29    2  Facet hypertrophy of lumbar region  M47 816                   Subjective:   Pt reports feeling pretty good today  Mild pain in R LB region  Objective: See treatment diary below      Assessment: Tolerated treatment well  Patient demonstrated fatigue post treatment, exhibited good technique with therapeutic exercises and would benefit from continued PT  Most tightness present today over R QL and glute med  Negative long sit test; SIJ remaining stabilized  Plan: Continue per plan of care        Precautions: none      Manuals 3/1 3/9 3/16 3/23 3/30 4/6       Scissor MET to correct L ant torsion 5 min np np np np np       TPR L glute med, piriformis, QL 5 min B 15 min 10 min B np 10 min 10 min; R side today       Graston/TPR B paraspinals   15 min 20 min 15 min 15 min                    Neuro Re-Ed 3/1 3/9 3/16 3/23 3/30 46       ppt 5"x20 5"x20 hep hep hep hep       bridges 5"x20 5"x20 5"x20 5"x20 5"x10 5"x10       clamshells 5"x20 5"x20 5"x20 5"x20 RTB 5"x20 RTB 5"x20       Ab brace w/ SLR nv x20 ea x20 x20 x20 x20       Prone hip ext nv x20 ea x20 x20 x20 x20       Bridges w/ alt kicks     x10 x10                    Ther Ex 3/1 3/9 3/16 3/23 3/30 46       Piriformis stretch nv nv 30"x3 30"x3 30"x3 30"x3       Hamstring stretch nv nv 30"x3 30"x3 30"x3 30"X3                                                                                     Ther Activity                                       Gait Training                                       Modalities

## 2021-04-20 ENCOUNTER — EVALUATION (OUTPATIENT)
Dept: PHYSICAL THERAPY | Facility: MEDICAL CENTER | Age: 36
End: 2021-04-20
Payer: COMMERCIAL

## 2021-04-20 DIAGNOSIS — M47.816 FACET HYPERTROPHY OF LUMBAR REGION: ICD-10-CM

## 2021-04-20 DIAGNOSIS — G89.29 CHRONIC BILATERAL LOW BACK PAIN WITHOUT SCIATICA: Primary | ICD-10-CM

## 2021-04-20 DIAGNOSIS — M54.50 CHRONIC BILATERAL LOW BACK PAIN WITHOUT SCIATICA: Primary | ICD-10-CM

## 2021-04-20 PROCEDURE — 97112 NEUROMUSCULAR REEDUCATION: CPT | Performed by: PHYSICAL THERAPIST

## 2021-04-20 PROCEDURE — 97140 MANUAL THERAPY 1/> REGIONS: CPT | Performed by: PHYSICAL THERAPIST

## 2021-04-20 PROCEDURE — 97110 THERAPEUTIC EXERCISES: CPT | Performed by: PHYSICAL THERAPIST

## 2021-04-20 NOTE — PROGRESS NOTES
PT Re-Evaluation  and PT Discharge    Today's date: 2021  Patient name: Yamilex Ko  : 1985  MRN: [id number]  Referring provider: Raquel Montemayor DO  Dx:   Encounter Diagnosis     ICD-10-CM    1  Chronic bilateral low back pain without sciatica  M54 5     G89 29    2  Facet hypertrophy of lumbar region  M47 816                   Assessment  Assessment details: Yamilex Ko has attended  7 visits since initiating PT and has demonstrated overall improvement  Mobility and strength has improved with decreased reports of pain  Yamilex Ko has demonstrated an improvement in function as well  SIJ dysfunction has resolved Currently, she has made steady progress towards her goals, but continue to remain limited with prolonged sitting or walking at times  Intermittent tightness does persist in glute med and QL  She does demonstrate understanding of HEP  At this time, plan to trial independent HEP  She will call for f/u appt as needed  Impairments: abnormal muscle tone, abnormal movement, activity intolerance and pain with function  Understanding of Dx/Px/POC: good   Prognosis: good    Goals  Short Term Goals:   1  Pain decreased 2 ratings in 4 weeks MET  2   ROM increased 10* in 4 weeks MET  3  Strength increased 1/2 grade in 4 weeks MET    Long Term Goals:   1  ADLS/IADLS in related activities improved to maximal level in 8 weeks  MET  2  Work performance improved to maximal level in 8 weeks MET  3   Standing is improved to maximal level in 8 weeks  PARTIALLY MET  4  [place] with HEP in 8 weeks   MET    Plan  Patient would benefit from: skilled physical therapy  Planned modality interventions: cryotherapy  Planned therapy interventions: abdominal trunk stabilization, flexibility, therapeutic exercise, stretching, strengthening, patient education, neuromuscular re-education, manual therapy and home exercise program  Frequency: 1x week  Duration in weeks: 1  Treatment plan discussed with: patient        Subjective Evaluation    History of Present Illness  Mechanism of injury: Pt reports overall improvement since beginning PT  Pain severity and frequency have decreased  Pain is now intermittent in nature and tends to be worse on the R  Most pain is located across lower lumbar region and at times into hip flexor and QL  Pain is most significant w/ prolonged sitting, prolonged walking, bending  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  Pain  At best pain ratin  At worst pain ratin    Patient Goals  Patient goals for therapy: decreased pain, increased motion, increased strength, independence with ADLs/IADLs and return to work          Objective     Palpation     Additional Palpation Details  Intermittent Mild restrictions and tightness persisting over glute med and QL    Active Range of Motion     Lumbar   Flexion: 50 degrees   Extension: 10 degrees     Strength/Myotome Testing     Left Hip   Planes of Motion   Flexion: 5  Extension: 5  Abduction: 5    Right Hip   Planes of Motion   Flexion: 5  Extension: 5  Abduction: 5    Tests     Left Hip   Negative long sit  Additional Tests Details  SIJ dysfunction resolved        Flowsheet Rows      Most Recent Value   PT/OT G-Codes   Current Score  67   Projected Score  62             Precautions: none      Manuals             Scissor MET to correct L ant torsion np            Jonnathan B glute med/QL 15 min                                      Neuro Re-Ed             Bridge w/ kicks hep                         clamshells RTB hep            Ab brace w/ SLR hep            Prone hip ext hep                                      Ther Ex             Piriformis stretch hep            Hamstring stretch hep            Kneeling hip flexor stretch instruted            QL stretch performed and instructed                                                                Ther Activity Gait Training                                       Modalities

## 2021-10-20 ENCOUNTER — ANNUAL EXAM (OUTPATIENT)
Dept: OBGYN CLINIC | Facility: CLINIC | Age: 36
End: 2021-10-20
Payer: COMMERCIAL

## 2021-10-20 VITALS
DIASTOLIC BLOOD PRESSURE: 82 MMHG | SYSTOLIC BLOOD PRESSURE: 130 MMHG | WEIGHT: 166 LBS | HEIGHT: 66 IN | BODY MASS INDEX: 26.68 KG/M2

## 2021-10-20 DIAGNOSIS — Z01.419 ENCNTR FOR GYN EXAM (GENERAL) (ROUTINE) W/O ABN FINDINGS: Primary | ICD-10-CM

## 2021-10-20 DIAGNOSIS — Z11.51 SCREENING FOR HUMAN PAPILLOMAVIRUS (HPV): ICD-10-CM

## 2021-10-20 PROCEDURE — G0145 SCR C/V CYTO,THINLAYER,RESCR: HCPCS | Performed by: OBSTETRICS & GYNECOLOGY

## 2021-10-20 PROCEDURE — 1036F TOBACCO NON-USER: CPT | Performed by: OBSTETRICS & GYNECOLOGY

## 2021-10-20 PROCEDURE — 3008F BODY MASS INDEX DOCD: CPT | Performed by: OBSTETRICS & GYNECOLOGY

## 2021-10-20 PROCEDURE — G0476 HPV COMBO ASSAY CA SCREEN: HCPCS | Performed by: OBSTETRICS & GYNECOLOGY

## 2021-10-20 PROCEDURE — 99395 PREV VISIT EST AGE 18-39: CPT | Performed by: OBSTETRICS & GYNECOLOGY

## 2021-10-21 LAB
HPV HR 12 DNA CVX QL NAA+PROBE: NEGATIVE
HPV16 DNA CVX QL NAA+PROBE: NEGATIVE
HPV18 DNA CVX QL NAA+PROBE: NEGATIVE

## 2021-10-27 LAB
LAB AP GYN PRIMARY INTERPRETATION: NORMAL
LAB AP LMP: NORMAL
Lab: NORMAL

## 2022-01-26 ENCOUNTER — TELEPHONE (OUTPATIENT)
Dept: INTERNAL MEDICINE CLINIC | Facility: OTHER | Age: 37
End: 2022-01-26

## 2022-01-26 ENCOUNTER — OFFICE VISIT (OUTPATIENT)
Dept: INTERNAL MEDICINE CLINIC | Facility: OTHER | Age: 37
End: 2022-01-26
Payer: COMMERCIAL

## 2022-01-26 VITALS
HEIGHT: 66 IN | SYSTOLIC BLOOD PRESSURE: 112 MMHG | OXYGEN SATURATION: 98 % | DIASTOLIC BLOOD PRESSURE: 76 MMHG | WEIGHT: 166 LBS | BODY MASS INDEX: 26.68 KG/M2 | HEART RATE: 68 BPM | TEMPERATURE: 99.4 F

## 2022-01-26 DIAGNOSIS — E03.8 SUBCLINICAL HYPOTHYROIDISM: ICD-10-CM

## 2022-01-26 DIAGNOSIS — Z00.00 ANNUAL PHYSICAL EXAM: ICD-10-CM

## 2022-01-26 DIAGNOSIS — Z11.1 ENCOUNTER FOR PPD TEST: Primary | ICD-10-CM

## 2022-01-26 DIAGNOSIS — E78.00 HYPERCHOLESTEREMIA: ICD-10-CM

## 2022-01-26 PROCEDURE — 1036F TOBACCO NON-USER: CPT | Performed by: NURSE PRACTITIONER

## 2022-01-26 PROCEDURE — 99395 PREV VISIT EST AGE 18-39: CPT | Performed by: NURSE PRACTITIONER

## 2022-01-26 PROCEDURE — 3725F SCREEN DEPRESSION PERFORMED: CPT | Performed by: NURSE PRACTITIONER

## 2022-01-26 PROCEDURE — 3008F BODY MASS INDEX DOCD: CPT | Performed by: NURSE PRACTITIONER

## 2022-01-26 NOTE — PROGRESS NOTES
[facility] [address] [place] [address] Adult Female Physical Visit  Patient ID: Nader Whitley    : 1985  Age/Gender: 39 y o  female     DATE: 2022      Assessment/Plan:    Annual physical exam  Patient will need to obtain immunization record  Patient up-to-date with cervical cancer screening, encouraged self-breast examinations  Would recommend fasting blood work  Continue with daily exercise and a well-balanced diet  Follow-up in 1 year for annual physical or sooner if needed  Physical form in my folder in the [place] office  Needs TB testing prior to signing off  BMI Counseling: Body mass index is 26 79 kg/m²  The BMI is above normal  Nutrition recommendations include decreasing portion sizes, encouraging healthy choices of fruits and vegetables, decreasing fast food intake, consuming healthier snacks, limiting drinks that contain sugar, moderation in carbohydrate intake, increasing intake of lean protein, reducing intake of saturated and trans fat and reducing intake of cholesterol  Exercise recommendations include moderate physical activity 150 minutes/week and exercising 3-5 times per week  Rationale for BMI follow-up plan is due to patient being overweight or obese  Depression Screening and Follow-up Plan: Patient was screened for depression during today's encounter  They screened negative with a PHQ-2 score of 0  Diagnoses and all orders for this visit:    Encounter for PPD test  -     Cancel: TB Skin Test    Hypercholesteremia  -     Comprehensive metabolic panel;  Future  -     CBC and differential  -     Lipid panel    Subclinical hypothyroidism  -     TSH, 3rd generation with Free T4 reflex    Annual physical exam          Subjective:     Nader Whitley is a 39 y o  female who presents to the office on 2022 for a health maintenance physical     HPI  The following portions of the patient's history were reviewed and updated as appropriate: allergies, current medications, past family history, past medical history, past social history, past surgical history and problem list     Pt reports overall health: good  Last Physical: unknowns  Last GYN Exam:  10/20/21    Healthy Diet:  Well balanced  Routine Exercise:  6 days a week  Weight Concerns: would like to lose some    Problems with vision:  Denies  Last Eye Exam:  Unknown    Problems with Hearing:  Denies    Routine Dental Exams:  Every 6 months    Smoking History:  Denies  ETOH Use:  Social  Illegal Drug Use: denies, medical marijuana for anxiety   Caffeine Use:  Coffee    Reproductive Health:    Last PAP: 2021  History of abnormal PAP:  Denies  LMP:  Rodrigue 3  Sexually Active:  Denies  Contraceptive:  Denies  Problems with menstrual cycle:  Denies  Vaginal Discharge:  Denies      Self Breast Exams: denies  Denies family history of breast CA    Depression Screening:  PHQ-2/9 Depression Screening    Little interest or pleasure in doing things: 0 - not at all  Feeling down, depressed, or hopeless: 0 - not at all  PHQ-2 Score: 0  PHQ-2 Interpretation: Negative depression screen           Family History of Colon CA:  Denies      Last Labs: 2018    Review of Systems   Constitutional: Negative for activity change, appetite change, chills, diaphoresis and fever  HENT: Negative for congestion, ear discharge, ear pain, postnasal drip, rhinorrhea, sinus pressure, sinus pain and sore throat  Eyes: Negative for pain, discharge, itching and visual disturbance  Respiratory: Negative for cough, chest tightness, shortness of breath and wheezing  Cardiovascular: Negative for chest pain, palpitations and leg swelling  Gastrointestinal: Negative for abdominal pain, blood in stool, constipation, diarrhea, nausea and vomiting  Endocrine: Negative for polydipsia, polyphagia and polyuria  Genitourinary: Negative for difficulty urinating, dysuria, hematuria and urgency     Musculoskeletal: Negative for arthralgias, back pain and neck pain  Skin: Negative for rash and wound  Neurological: Negative for dizziness, weakness, numbness and headaches  Patient Active Problem List   Diagnosis    Hypercholesteremia    Migraine headache    Lower back pain    Subclinical hypothyroidism    Physical exam, pre-employment    Annual physical exam    External hemorrhoids    Facet hypertrophy of lumbar region       Past Medical History:   Diagnosis Date    Migraine        Past Surgical History:   Procedure Laterality Date    TOOTH EXTRACTION         No current outpatient medications on file  Allergies   Allergen Reactions    Pollen Extract Allergic Rhinitis       Social History     Socioeconomic History    Marital status: /Civil Union     Spouse name: None    Number of children: None    Years of education: None    Highest education level: None   Occupational History    None   Tobacco Use    Smoking status: Never Smoker    Smokeless tobacco: Never Used   Vaping Use    Vaping Use: Never used   Substance and Sexual Activity    Alcohol use:  Yes     Alcohol/week: 2 0 standard drinks     Types: 2 Glasses of wine per week     Comment: 6 glasses a week    Drug use: Yes     Types: Marijuana     Comment: medical    Sexual activity: Yes     Partners: Male     Birth control/protection: Male Sterilization   Other Topics Concern    None   Social History Narrative    Always uses seat belt     Daily coffee consumption (2cups/day)    Lack of exercise      Social Determinants of Health     Financial Resource Strain: Not on file   Food Insecurity: Not on file   Transportation Needs: Not on file   Physical Activity: Not on file   Stress: Not on file   Social Connections: Not on file   Intimate Partner Violence: Not on file   Housing Stability: Not on file       Family History   Problem Relation Age of Onset    Hypothyroidism Mother     Coronary artery disease Father     Heart attack Father  Hypertension Father     Lung cancer Paternal Grandmother     Hearing loss Daughter         deaf in right ear, microtia of right ear    Depression Family     Post-traumatic stress disorder Family     Anxiety disorder Family         symptom    Migraines Family     Thyroid disease Family         thyroid disorder     No Known Problems Sister     No Known Problems Sister     Lung cancer Maternal Grandmother     Diabetes Maternal Grandfather        Immunization History   Administered Date(s) Administered    INFLUENZA 10/16/2015, 02/16/2018, 10/26/2020    Influenza Injectable, MDCK, Preservative Free, Quadrivalent 10/14/2019    Influenza Injectable, MDCK, Preservative Free, Quadrivalent, 0 5 mL 10/26/2020    Influenza Whole 11/28/2007    Influenza, injectable, quadrivalent, preservative free 0 5 mL 10/04/2018    Influenza, seasonal, injectable, preservative free 10/16/2015    Tdap 10/16/2015    Tuberculin Skin Test-PPD Intradermal 10/05/2012, 03/26/2019        Health Maintenance   Topic Date Due    Hepatitis C Screening  Never done    COVID-19 Vaccine (1) Never done    Influenza Vaccine (1) 09/01/2021    BMI: Followup Plan  11/13/2021    Depression Screening  01/26/2023    BMI: Adult  01/26/2023    Annual Physical  01/26/2023    Cervical Cancer Screening  10/20/2024    DTaP,Tdap,and Td Vaccines (2 - Td or Tdap) 10/16/2025    HIV Screening  Completed    Pneumococcal Vaccine: Pediatrics (0 to 5 Years) and At-Risk Patients (6 to 59 Years)  Aged Out    HIB Vaccine  Aged Out    Hepatitis B Vaccine  Aged Out    IPV Vaccine  Aged Out    Hepatitis A Vaccine  Aged Out    Meningococcal ACWY Vaccine  Aged Out    HPV Vaccine  Aged Out         Objective:  Vitals:    01/26/22 1613   BP: 112/76   BP Location: Left arm   Patient Position: Sitting   Cuff Size: Adult   Pulse: 68   Temp: 99 4 °F (37 4 °C)   TempSrc: Temporal   SpO2: 98%   Weight: 75 3 kg (166 lb)   Height: 5' 6" (1 676 m)     Wt Readings from Last 3 Encounters:   01/26/22 75 3 kg (166 lb)   10/20/21 75 3 kg (166 lb)   02/17/21 68 9 kg (152 lb)     Body mass index is 26 79 kg/m²  No exam data present       Physical Exam  Constitutional:       General: She is not in acute distress  Appearance: She is well-developed  She is not diaphoretic  HENT:      Head: Normocephalic and atraumatic  Right Ear: External ear normal       Left Ear: External ear normal       Nose: Nose normal       Mouth/Throat:      Pharynx: No oropharyngeal exudate  Eyes:      General:         Right eye: No discharge  Left eye: No discharge  Conjunctiva/sclera: Conjunctivae normal       Pupils: Pupils are equal, round, and reactive to light  Neck:      Thyroid: No thyromegaly  Cardiovascular:      Rate and Rhythm: Normal rate and regular rhythm  Heart sounds: Normal heart sounds  No murmur heard  No friction rub  No gallop  Pulmonary:      Effort: Pulmonary effort is normal  No respiratory distress  Breath sounds: Normal breath sounds  No stridor  No wheezing or rales  Abdominal:      General: Bowel sounds are normal  There is no distension  Palpations: Abdomen is soft  Tenderness: There is no abdominal tenderness  Musculoskeletal:      Cervical back: Normal range of motion and neck supple  Lymphadenopathy:      Cervical: No cervical adenopathy  Skin:     General: Skin is warm and dry  Findings: No erythema or rash  Neurological:      Mental Status: She is alert and oriented to person, place, and time  Psychiatric:         Behavior: Behavior normal          Thought Content:  Thought content normal          Judgment: Judgment normal              Future Appointments   Date Time Provider Corina Brennan   10/25/2022  3:40 PM Doug Thrasher MD [address], CRNP   [facility] [address] [place] [address]    Patient Care Team:  Devendra Hilton MD as PCP - General (Internal Medicine)  MD Grupo Pack MD Derrel Kales, MD Rennie Amour, MD Royanne Konig, Colletta Mohair, MD

## 2022-01-26 NOTE — ASSESSMENT & PLAN NOTE
Patient will need to obtain immunization record  Patient up-to-date with cervical cancer screening, encouraged self-breast examinations  Would recommend fasting blood work  Continue with daily exercise and a well-balanced diet  Follow-up in 1 year for annual physical or sooner if needed  Physical form in my folder in the [place] office  Needs TB testing prior to signing off

## 2022-01-26 NOTE — TELEPHONE ENCOUNTER
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

## 2022-01-31 ENCOUNTER — CLINICAL SUPPORT (OUTPATIENT)
Dept: INTERNAL MEDICINE CLINIC | Facility: OTHER | Age: 37
End: 2022-01-31
Payer: COMMERCIAL

## 2022-01-31 DIAGNOSIS — Z11.1 ENCOUNTER FOR PPD TEST: Primary | ICD-10-CM

## 2022-01-31 PROCEDURE — 86580 TB INTRADERMAL TEST: CPT

## 2022-02-02 ENCOUNTER — CLINICAL SUPPORT (OUTPATIENT)
Dept: INTERNAL MEDICINE CLINIC | Facility: OTHER | Age: 37
End: 2022-02-02

## 2022-02-02 DIAGNOSIS — Z11.1 ENCOUNTER FOR PPD SKIN TEST READING: Primary | ICD-10-CM

## 2022-02-02 LAB
INDURATION: 0 MM
TB SKIN TEST: NEGATIVE

## 2022-10-25 ENCOUNTER — ANNUAL EXAM (OUTPATIENT)
Dept: OBGYN CLINIC | Facility: CLINIC | Age: 37
End: 2022-10-25

## 2022-10-25 VITALS
SYSTOLIC BLOOD PRESSURE: 116 MMHG | BODY MASS INDEX: 26.52 KG/M2 | HEIGHT: 66 IN | DIASTOLIC BLOOD PRESSURE: 66 MMHG | WEIGHT: 165 LBS

## 2022-10-25 DIAGNOSIS — Z01.419 ENCOUNTER FOR GYNECOLOGICAL EXAMINATION WITHOUT ABNORMAL FINDING: Primary | ICD-10-CM

## 2022-10-25 NOTE — PROGRESS NOTES
Jodi Zuñiga  1985      CC:  Yearly exam    S:  44 y o  female here for yearly exam  Her cycles are regular, not heavy or crampy  Sexual activity: She is sexually active without pain, bleeding or dryness  Contraception: She uses her 's vasectomy for contraception  Last Pap 10/20/2021 - normal/negative HPV  Last Mammo - never    We reviewed ASC guidelines for Pap testing today  No current outpatient medications on file  Social History     Socioeconomic History   • Marital status: /Civil Union     Spouse name: Not on file   • Number of children: Not on file   • Years of education: Not on file   • Highest education level: Not on file   Occupational History   • Not on file   Tobacco Use   • Smoking status: Never Smoker   • Smokeless tobacco: Never Used   Vaping Use   • Vaping Use: Never used   Substance and Sexual Activity   • Alcohol use:  Yes     Alcohol/week: 3 0 standard drinks     Types: 3 Glasses of wine per week     Comment: 6 glasses a week   • Drug use: Yes     Types: Marijuana     Comment: Medical card   • Sexual activity: Yes     Partners: Male     Birth control/protection: Male Sterilization   Other Topics Concern   • Not on file   Social History Narrative    Always uses seat belt     Daily coffee consumption (2cups/day)    Lack of exercise      Social Determinants of Health     Financial Resource Strain: Not on file   Food Insecurity: Not on file   Transportation Needs: Not on file   Physical Activity: Not on file   Stress: Not on file   Social Connections: Not on file   Intimate Partner Violence: Not on file   Housing Stability: Not on file     Family History   Problem Relation Age of Onset   • Hypothyroidism Mother    • Coronary artery disease Father    • Heart attack Father    • Hypertension Father    • Lung cancer Paternal Grandmother    • Hearing loss Daughter         deaf in right ear, microtia of right ear   • Depression Family    • Post-traumatic stress disorder Family    • Anxiety disorder Family         symptom   • Migraines Family    • Thyroid disease Family         thyroid disorder    • No Known Problems Sister    • No Known Problems Sister    • Lung cancer Maternal Grandmother    • Diabetes Maternal Grandfather       Past Medical History:   Diagnosis Date   • Migraine         Review of Systems   Respiratory: Negative  Cardiovascular: Negative  Gastrointestinal: Negative for constipation and diarrhea  Genitourinary: Negative for difficulty urinating, pelvic pain, vaginal bleeding, vaginal discharge, itching or odor  O:  Blood pressure 116/66, height 5' 6" (1 676 m), weight 74 8 kg (165 lb), last menstrual period 10/13/2022  Patient appears well and is not in distress  Neck is supple without masses  Breasts are symmetrical without mass, tenderness, nipple discharge, skin changes or adenopathy  Abdomen is soft and nontender without masses  External genitals are normal without lesions or rashes  Urethral meatus and urethra are normal  Bladder is normal to palpation  Vagina is normal without discharge or bleeding  Cervix is normal without discharge or lesion  Uterus is normal, mobile, nontender without palpable mass  Adnexa are normal, nontender, without palpable mass  A:   Yearly exam      P:   Pap due 2026    RTO one year for yearly exam or sooner as needed

## 2024-01-16 ENCOUNTER — OFFICE VISIT (OUTPATIENT)
Dept: OBGYN CLINIC | Facility: CLINIC | Age: 39
End: 2024-01-16
Payer: COMMERCIAL

## 2024-01-16 VITALS
SYSTOLIC BLOOD PRESSURE: 120 MMHG | DIASTOLIC BLOOD PRESSURE: 80 MMHG | WEIGHT: 170.4 LBS | HEIGHT: 66 IN | BODY MASS INDEX: 27.38 KG/M2

## 2024-01-16 DIAGNOSIS — Z01.419 ENCOUNTER FOR GYNECOLOGICAL EXAMINATION WITHOUT ABNORMAL FINDING: Primary | ICD-10-CM

## 2024-01-16 PROBLEM — Z00.00 ANNUAL PHYSICAL EXAM: Status: RESOLVED | Noted: 2019-03-26 | Resolved: 2024-01-16

## 2024-01-16 PROBLEM — Z02.1 PHYSICAL EXAM, PRE-EMPLOYMENT: Status: RESOLVED | Noted: 2019-03-26 | Resolved: 2024-01-16

## 2024-01-16 PROCEDURE — S0612 ANNUAL GYNECOLOGICAL EXAMINA: HCPCS | Performed by: OBSTETRICS & GYNECOLOGY

## 2024-01-16 NOTE — PROGRESS NOTES
Uzma Yanes  1985      CC:  Yearly exam    S:  38 y.o. female here for yearly exam. Her cycles are regular, not heavy or crampy. She does have a day without bleeding followed by one additional day of bleeding with each period.  This has been her pattern for a little while and is not changing.      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      Sexual activity: She is sexually active without pain, bleeding or dryness.     Contraception: She uses her 's vasectomy for contraception.     Last Pap 10/20/2021 - normal/negative HPV  Last Mammo - never    We reviewed [facility] guidelines for Pap testing today.     No current outpatient medications on file.  Social History     Socioeconomic History    Marital status: /Civil Union     Spouse name: Not on file    Number of children: Not on file    Years of education: Not on file    Highest education level: Not on file   Occupational History    Not on file   Tobacco Use    Smoking status: Never    Smokeless tobacco: Never   Vaping Use    Vaping status: Never Used   Substance and Sexual Activity    Alcohol use: Yes     Alcohol/week: 2.0 standard drinks of alcohol     Types: 2 Glasses of wine per week     Comment: 6 glasses a week    Drug use: Yes     Types: Marijuana     Comment: Medical card    Sexual activity: Yes     Partners: Male     Birth control/protection: Male Sterilization   Other Topics Concern    Not on file   Social History Narrative    Always uses seat belt     Daily coffee consumption (2cups/day)    Lack of exercise      Social Determinants of Health     Financial Resource Strain: Not on file   Food Insecurity: Not on file   Transportation Needs: Not on file   Physical Activity: Not on file   Stress: Not on file   Social Connections: Not on file   Intimate Partner Violence: Not on file   Housing Stability: Not on file     Family History   Problem Relation Age of Onset    Hypothyroidism Mother      "Coronary artery disease Father     Heart attack Father     Hypertension Father     Lung cancer Paternal Grandmother     Hearing loss Daughter         deaf in right ear, microtia of right ear    Depression Family     Post-traumatic stress disorder Family     Anxiety disorder Family         symptom    Migraines Family     Thyroid disease Family         thyroid disorder     No Known Problems Sister     No Known Problems Sister     Lung cancer Maternal Grandmother     Diabetes Maternal Grandfather       Past Medical History:   Diagnosis Date    Migraine         Review of Systems   Respiratory: Negative.    Cardiovascular: Negative.    Gastrointestinal: Negative for constipation and diarrhea.   Genitourinary: Negative for difficulty urinating, pelvic pain, vaginal bleeding, vaginal discharge, itching or odor.    O:  Blood pressure 120/80, height 5' 5.75\" (1.67 m), weight 77.3 kg (170 lb 6.4 oz), last menstrual period 01/08/2024.    Patient appears well and is not in distress  Neck is supple without masses  Breasts are symmetrical without mass, tenderness, nipple discharge, skin changes or adenopathy.   Abdomen is soft and nontender without masses.   External genitals are normal without lesions or rashes.  Urethral meatus and urethra are normal  Bladder is normal to palpation  Vagina is normal without discharge or bleeding.   Cervix is normal without discharge or lesion.   Uterus is normal, mobile, nontender without palpable mass.  Adnexa are normal, nontender, without palpable mass.     A:   Yearly exam.     P:   Pap due 2026   Mammo at age 39    RTO one year for yearly exam or sooner as needed.     "

## 2025-04-08 ENCOUNTER — ANNUAL EXAM (OUTPATIENT)
Dept: OBGYN CLINIC | Facility: CLINIC | Age: 40
End: 2025-04-08
Payer: COMMERCIAL

## 2025-04-08 VITALS
SYSTOLIC BLOOD PRESSURE: 130 MMHG | HEIGHT: 66 IN | DIASTOLIC BLOOD PRESSURE: 66 MMHG | WEIGHT: 174.5 LBS | BODY MASS INDEX: 28.04 KG/M2

## 2025-04-08 DIAGNOSIS — Z12.31 ENCOUNTER FOR SCREENING MAMMOGRAM FOR MALIGNANT NEOPLASM OF BREAST: ICD-10-CM

## 2025-04-08 DIAGNOSIS — Z01.419 ENCNTR FOR GYN EXAM (GENERAL) (ROUTINE) W/O ABN FINDINGS: Primary | ICD-10-CM

## 2025-04-08 PROCEDURE — S0612 ANNUAL GYNECOLOGICAL EXAMINA: HCPCS | Performed by: OBSTETRICS & GYNECOLOGY

## 2025-04-08 RX ORDER — IPRATROPIUM BROMIDE 21 UG/1
SPRAY, METERED NASAL
COMMUNITY
Start: 2025-02-19

## 2025-04-08 RX ORDER — FERROUS SULFATE 325(65) MG
1 TABLET ORAL EVERY OTHER DAY
COMMUNITY
Start: 2025-01-31

## 2025-04-08 NOTE — PROGRESS NOTES
"February 26, 2024     Everton Rubio DO  [address]  [address]  [facility] [address]    Patient: Yesenia Milner   YOB: 1934   Date of Visit: 2/26/2024       Dear Dr. Everton Rubio DO:    Thank you for referring Yesenia Milner to me for evaluation. Below are my notes for this consultation.  If you have questions, please do not hesitate to call me. I look forward to following your patient along with you.       Sincerely,     James C Ramicone, DO      CC: No Recipients  ______________________________________________________________________________________    History Of Present Illness:      This is a 89-year-old female with a history of sick sinus syndrome.  She underwent a pacemaker pulse generator replacement in July 2023.  The patient had a stroke in August 2023 and is now taking clopidogrel 75 mg daily.  No complaints of palpitations, chest pain, shortness of breath and no pacemaker related problems.    Review of Systems  Other review of systems negative     Last Recorded Vitals:      5/22/2023     1:17 PM 8/9/2023    10:44 AM 9/11/2023    12:15 PM 9/29/2023     9:58 AM 12/11/2023     2:04 PM 1/3/2024     3:33 PM 2/26/2024     2:00 PM   Vitals   Systolic 108 116 124 128 122 138 128   Diastolic 64 60 66 78 62 70 70   Heart Rate 83 82 81  64 75 91   Temp 36.3 °C (97.3 °F)  36.2 °C (97.1 °F) 36.3 °C (97.3 °F)      Resp 17         Height (in)  1.6 m (5' 3\")   1.6 m (5' 3\")  1.6 m (5' 3\")   Weight (lb) 168.8 168 166.8 168.2 164.6 161 165   BMI 29.9 kg/m2 29.76 kg/m2 29.55 kg/m2 29.8 kg/m2 29.16 kg/m2 28.52 kg/m2 29.23 kg/m2   BSA (m2) 1.85 m2 1.84 m2 1.83 m2 1.84 m2 1.82 m2 1.8 m2 1.82 m2   Visit Report Report  Report Report Report Report Report          Allergies:  Iodine, Shrimp, and Hydrocodone    Outpatient Medications:  Current Outpatient Medications   Medication Instructions   • albuterol 90 mcg/actuation inhaler 2 puffs, inhalation, 4 times daily   • CALCIUM " Uzma Yanes  1984      CC:  Yearly exam    S:  39 y.o. female here for yearly exam. Her cycles are regular, slightly longer than before but stable in this new pattern, not heavy or crampy.     Sexual activity: She is sexually active without pain, bleeding or dryness.     Contraception: She uses her partner's vasectomy for contraception.     Last Pap 10/20/2021 - normal/negative HPV  Last Mammo 7/2/24 - BIRAD-1    We reviewed ASCCP guidelines for Pap testing today.       Current Outpatient Medications:     ferrous sulfate 325 (65 Fe) mg tablet, Take 1 tablet by mouth every other day, Disp: , Rfl:     ipratropium (ATROVENT) 0.03 % nasal spray, TAKE 2 SPRAYS IN THE NARES FOUR TIMES A DAY FOR FOUR DAYS (Patient not taking: Reported on 4/8/2025), Disp: , Rfl:   Social History     Socioeconomic History    Marital status: /Civil Union     Spouse name: Not on file    Number of children: Not on file    Years of education: Not on file    Highest education level: Not on file   Occupational History    Not on file   Tobacco Use    Smoking status: Never    Smokeless tobacco: Never   Vaping Use    Vaping status: Never Used   Substance and Sexual Activity    Alcohol use: Yes     Alcohol/week: 2.0 standard drinks of alcohol     Types: 2 Glasses of wine per week     Comment: 6 glasses a week    Drug use: Yes     Types: Marijuana     Comment: Medical card    Sexual activity: Yes     Partners: Male     Birth control/protection: Male Sterilization   Other Topics Concern    Not on file   Social History Narrative    Always uses seat belt     Daily coffee consumption (2cups/day)    Lack of exercise      Social Drivers of Health     Financial Resource Strain: Low Risk  (6/30/2024)    Received from [facility]    Overall Financial Resource Strain (CARDIA)     Difficulty of Paying Living Expenses: Not hard at all   Food Insecurity: No Food Insecurity (6/30/2024)    Received from [facility]     Hunger Vital Sign     Worried About Running Out of Food in the Last Year: Never true     Ran Out of Food in the Last Year: Never true   Transportation Needs: No Transportation Needs (6/30/2024)    Received from [facility]    PRAPARE - Transportation     Lack of Transportation (Medical): No     Lack of Transportation (Non-Medical): No   Physical Activity: Not on file   Stress: Not on file   Social Connections: Feeling Socially Integrated (6/30/2024)    Received from [facility]    OASIS : Social Isolation     How often do you feel lonely or isolated from those around you?: Never   Intimate Partner Violence: Not At Risk (6/30/2024)    Received from [facility]    Humiliation, Afraid, Rape, and Kick questionnaire     Fear of Current or Ex-Partner: No     Emotionally Abused: No     Physically Abused: No     Sexually Abused: No   Housing Stability: Low Risk  (6/30/2024)    Received from [facility]    Housing Stability Vital Sign     Unable to Pay for Housing in the Last Year: No     Number of Times Moved in the Last Year: 0     Homeless in the Last Year: No     Family History   Problem Relation Age of Onset    Hypothyroidism Mother     Coronary artery disease Father     Heart attack Father     Hypertension Father     Lung cancer Paternal Grandmother     Hearing loss Daughter         deaf in right ear, microtia of right ear    Depression Family     Post-traumatic stress disorder Family     Anxiety disorder Family         symptom    Migraines Family     Thyroid disease Family         thyroid disorder     No Known Problems Sister     No Known Problems Sister     Lung cancer Maternal Grandmother     Diabetes Maternal Grandfather       Past Medical History:   Diagnosis Date    Allergic     Anxiety     Headache(784.0)     Migraine         Review of Systems   Respiratory: Negative.    Cardiovascular: Negative.    Gastrointestinal: Negative for  CARBONATE-VITAMIN D3 ORAL oral   • clopidogrel (Plavix) 75 mg tablet Take 1 tablet (75 mg) by mouth once daily.   • fluticasone propion-salmeteroL (Advair) 115-21 mcg/actuation inhaler 2 puffs, inhalation, 2 times daily, Rinse mouth with water after use to reduce aftertaste and incidence of candidiasis. Do not swallow.   • metoprolol tartrate (Lopressor) 25 mg tablet TAKE 1/2 (ONE-HALF) TABLET BY MOUTH ONCE DAILY IN THE MORNING   • spironolactone (ALDACTONE) 25 mg, oral, Daily   • torsemide (DEMADEX) 10 mg, oral, Daily, Take 1 1/2 tablets (15 mg) daily   • vit A/vit C/vit E/zinc/copper (PRESERVISION AREDS ORAL) 2 capsules, oral, Daily       Physical Exam:    General Appearance:  Alert, oriented, no distress  Skin:  Warm and dry  Head and Neck:  No elevation of JVP, no carotid bruits  Cardiac Exam:  Rhythm is regular, S1 and S2 are normal, grade 2/6 systolic murmur at the lower left sternal border and apex  Lungs:  Clear to auscultation  Extremities:  no edema  Neurologic:  No focal deficits  Psychiatric:  Appropriate mood and behavior         Cardiology Tests:  I have personally review the diagnostic cardiac testing and my interpretation is as follows:    Echocardiogram August 2023: Ejection fraction 60 to 65% with moderate mitral valve regurgitation      Assessment/Plan  Problem List Items Addressed This Visit             ICD-10-CM    Presence of permanent cardiac pacemaker - Primary Z95.0    Sick sinus syndrome (CMS/[facility]) I49.5     1.  Sick sinus syndrome: This patient has a history of symptomatic sinus bradycardia and has a [employer]tronic dual-chamber permanent pacemaker which was replaced in July 2023.  The original device implantation was October 2012.  The pacemaker is functioning appropriately and the battery status is stable.  Continue follow-up as scheduled through the cardiac device clinic.  The patient will follow-up with me in 1 year.            James C Ramicone, DO     "constipation and diarrhea.   Genitourinary: Negative for difficulty urinating, pelvic pain, vaginal bleeding, vaginal discharge, itching or odor.    O:  Blood pressure 130/66, height 5' 5.5\" (1.664 m), weight 79.2 kg (174 lb 8 oz), last menstrual period 03/17/2025.    Patient appears well and is not in distress  Neck is supple without masses  Breasts are symmetrical without mass, tenderness, nipple discharge, skin changes or adenopathy.   Abdomen is soft and nontender without masses.   External genitals are normal without lesions or rashes.  Urethral meatus and urethra are normal  Bladder is normal to palpation  Vagina is normal without discharge or bleeding.   Cervix is normal without discharge or lesion. +cervical polyp - discussed; not removed but will consider next year prior to pap  Uterus is normal, mobile, nontender without palpable mass.  Adnexa are normal, nontender, without palpable mass.     A:   Yearly exam.     P:   Pap due 2026   Mammo ordered    RTO one year for yearly exam or sooner as needed.     "